# Patient Record
Sex: MALE | Race: WHITE | NOT HISPANIC OR LATINO | Employment: OTHER | URBAN - METROPOLITAN AREA
[De-identification: names, ages, dates, MRNs, and addresses within clinical notes are randomized per-mention and may not be internally consistent; named-entity substitution may affect disease eponyms.]

---

## 2017-05-09 ENCOUNTER — APPOINTMENT (OUTPATIENT)
Dept: GENERAL RADIOLOGY | Facility: HOSPITAL | Age: 32
End: 2017-05-09

## 2017-05-09 ENCOUNTER — TELEPHONE (OUTPATIENT)
Dept: ORTHOPEDIC SURGERY | Facility: CLINIC | Age: 32
End: 2017-05-09

## 2017-05-09 ENCOUNTER — HOSPITAL ENCOUNTER (EMERGENCY)
Facility: HOSPITAL | Age: 32
Discharge: HOME | End: 2017-05-09
Attending: EMERGENCY MEDICINE | Admitting: EMERGENCY MEDICINE

## 2017-05-09 VITALS
HEIGHT: 74 IN | OXYGEN SATURATION: 99 % | SYSTOLIC BLOOD PRESSURE: 134 MMHG | RESPIRATION RATE: 16 BRPM | WEIGHT: 180 LBS | HEART RATE: 72 BPM | DIASTOLIC BLOOD PRESSURE: 78 MMHG | BODY MASS INDEX: 23.1 KG/M2 | TEMPERATURE: 97.6 F

## 2017-05-09 DIAGNOSIS — S92.355A CLOSED NONDISPLACED FRACTURE OF FIFTH METATARSAL BONE OF LEFT FOOT, INITIAL ENCOUNTER: Primary | ICD-10-CM

## 2017-05-09 PROCEDURE — 73630 X-RAY EXAM OF FOOT: CPT

## 2017-05-09 PROCEDURE — 99283 EMERGENCY DEPT VISIT LOW MDM: CPT

## 2017-05-09 RX ORDER — HYDROCODONE BITARTRATE AND ACETAMINOPHEN 5; 325 MG/1; MG/1
1 TABLET ORAL EVERY 6 HOURS PRN
Qty: 12 TABLET | Refills: 0 | Status: SHIPPED | OUTPATIENT
Start: 2017-05-09 | End: 2017-06-07

## 2017-05-09 RX ORDER — ACETAMINOPHEN 500 MG
1000 TABLET ORAL ONCE
Status: COMPLETED | OUTPATIENT
Start: 2017-05-09 | End: 2017-05-09

## 2017-05-09 RX ADMIN — ACETAMINOPHEN 1000 MG: 500 TABLET ORAL at 11:13

## 2017-05-10 ENCOUNTER — OFFICE VISIT (OUTPATIENT)
Dept: ORTHOPEDIC SURGERY | Facility: CLINIC | Age: 32
End: 2017-05-10

## 2017-05-10 DIAGNOSIS — S92.354A CLOSED NONDISPLACED FRACTURE OF FIFTH METATARSAL BONE OF RIGHT FOOT, INITIAL ENCOUNTER: Primary | ICD-10-CM

## 2017-05-10 PROCEDURE — 28470 CLTX METATARSAL FX WO MNP EA: CPT | Performed by: ORTHOPAEDIC SURGERY

## 2017-05-10 PROCEDURE — 99203 OFFICE O/P NEW LOW 30 MIN: CPT | Performed by: ORTHOPAEDIC SURGERY

## 2017-05-12 ENCOUNTER — TELEPHONE (OUTPATIENT)
Dept: ORTHOPEDIC SURGERY | Facility: CLINIC | Age: 32
End: 2017-05-12

## 2017-05-14 PROBLEM — S92.354A CLOSED NONDISPLACED FRACTURE OF FIFTH METATARSAL BONE OF RIGHT FOOT: Status: ACTIVE | Noted: 2017-05-14

## 2017-06-07 ENCOUNTER — OFFICE VISIT (OUTPATIENT)
Dept: ORTHOPEDIC SURGERY | Facility: CLINIC | Age: 32
End: 2017-06-07

## 2017-06-07 VITALS — HEIGHT: 74 IN | WEIGHT: 180 LBS | BODY MASS INDEX: 23.1 KG/M2

## 2017-06-07 DIAGNOSIS — S92.354A CLOSED NONDISPLACED FRACTURE OF FIFTH METATARSAL BONE OF RIGHT FOOT, INITIAL ENCOUNTER: Primary | ICD-10-CM

## 2017-06-07 PROCEDURE — 99024 POSTOP FOLLOW-UP VISIT: CPT | Performed by: ORTHOPAEDIC SURGERY

## 2017-06-07 PROCEDURE — 73620 X-RAY EXAM OF FOOT: CPT | Performed by: ORTHOPAEDIC SURGERY

## 2017-06-07 NOTE — PROGRESS NOTES
Chief Complaint   Patient presents with   • Right Foot - Follow-up           HPI follow up on right foot Fifth metatarsal fracture.  He has tolerated his cast immobilization well.  There are no decubiti proximally or distally.  There is no clinical deformity.  He states that his pain is a whole lot less than before.  The swelling and bruising are present but settling down nicely.  The patient does understand the complexity of  HEALING of the fifth metatarsal fracture at the metaphyseal-diaphyseal junction because of a watershed and a poor blood supply in this location.        There were no vitals filed for this visit.        Review of Systems   Constitutional: Negative.    HENT: Negative.    Eyes: Negative.    Respiratory: Negative.    Cardiovascular: Negative.    Gastrointestinal: Negative.    Endocrine: Negative.    Genitourinary: Negative.    Musculoskeletal: Negative.    Skin: Negative.    Allergic/Immunologic: Negative.    Neurological: Negative.    Hematological: Negative.    Psychiatric/Behavioral: Negative.            Physical Exam   Constitutional: He appears well-developed and well-nourished.   HENT:   Head: Normocephalic.   Eyes: Pupils are equal, round, and reactive to light.   Neck: Neck supple.   Cardiovascular: Normal rate and intact distal pulses.    Pulmonary/Chest: Effort normal and breath sounds normal.   Abdominal: Soft. Bowel sounds are normal.   Musculoskeletal: Normal range of motion.   Neurological: He is alert. He has normal reflexes.   Skin: Skin is warm.   Psychiatric: He has a normal mood and affect. His behavior is normal. Judgment and thought content normal.   Nursing note and vitals reviewed.          Joint/Body Part Specific Exam:  Right foot: Patient has some pain and tenderness at the base of the fifth metatarsal on the lateral aspect at the site of the Powell fracture.  He is neurovascularly intact.  He states his pain is minimal.  Dorsiflexion 0-10°.  Plantar flexion 0-30°.   Neurovascular status is intact.  Patient is able to circumduct his ankle without too much difficulty and there is no clinical evidence of a DVT.      X-RAY Report:      right Foot and Ankle X-Ray  Indication: Right rotation of healing of the fifth metatarsal base  Views: AP, Lateral  Findings: Anatomically reduced fracture; very minimal callus noted  yes fracture  no bony lesion  Soft tissues within normal limits  within normal limits joint spaces  Hardware appropriately positioned not applicable    yes prior studies available for comparison.    X-RAY was ordered and reviewed by Jonnie Abdi MD    Diagnostics:        Obdulio was seen today for follow-up.    Diagnoses and all orders for this visit:    Closed nondisplaced fracture of fifth metatarsal bone of right foot, subsequent encounter  -     XR Foot 2 View Right          Procedures        Plan:  DC the cast.    Follow cam walking boot to immobilize the fracture.    Strict nonweightbearing for 4 more weeks    Use crutches and a motor scooter for mobility.    Calcium and vitamin D for bone health.    Tablet ibuprofen 600 mg tablet by mouth twice a day when necessary pain and discomfort.    Follow-up in 4 weeks for reevaluation and repeat x-rays.    The difficult nature of healing of this fracture discussed with the patient and he does understand that he may still end up needing an external bone stimulator or even an ORIF with bone grafting to allow this fracture to heal completely.

## 2017-07-05 ENCOUNTER — OFFICE VISIT (OUTPATIENT)
Dept: ORTHOPEDIC SURGERY | Facility: CLINIC | Age: 32
End: 2017-07-05

## 2017-07-05 VITALS — HEIGHT: 76 IN | BODY MASS INDEX: 21.92 KG/M2 | WEIGHT: 180 LBS | TEMPERATURE: 98.1 F

## 2017-07-05 DIAGNOSIS — S92.354D CLOSED NONDISPLACED FRACTURE OF FIFTH METATARSAL BONE OF RIGHT FOOT WITH ROUTINE HEALING, SUBSEQUENT ENCOUNTER: Primary | ICD-10-CM

## 2017-07-05 PROCEDURE — 99024 POSTOP FOLLOW-UP VISIT: CPT | Performed by: ORTHOPAEDIC SURGERY

## 2017-07-05 PROCEDURE — 73630 X-RAY EXAM OF FOOT: CPT | Performed by: ORTHOPAEDIC SURGERY

## 2017-07-05 NOTE — PROGRESS NOTES
Chief Complaint   Patient presents with   • Right Foot - Follow-up           HPI  Patient is here today for a follow up of his right foot.He has a base of fifth metatarsal fracture at the Powell location.  He does not have a clinical deformity.  The patient states that he is doing a whole lot better than before.  The range of motion of the hindfoot is improving significantly.  He is being as compliant as possible with limited to partial weightbearing on the foot.  He is using his cam walking boot fairly diligently.  I'm concerned that he is trending towards a delayed union and might require an external bone stimulator.  Eventually he is going to require surgical intervention in the form of screw placement with ORIF for the fifth metatarsal fracture.  At this point the patient is really not interested in any form of surgical intervention because he feels like he is progressing in terms of healing of the fracture because of declining symptoms and settling down off his swelling.        Vitals:    07/05/17 1616   Temp: 98.1 °F (36.7 °C)           Review of Systems   Constitutional: Negative for chills, fever and unexpected weight change.   HENT: Negative for trouble swallowing and voice change.    Eyes: Negative for visual disturbance.   Respiratory: Negative for cough and shortness of breath.    Cardiovascular: Negative for chest pain and leg swelling.   Gastrointestinal: Negative for abdominal pain, nausea and vomiting.   Endocrine: Negative for cold intolerance and heat intolerance.   Genitourinary: Negative for difficulty urinating, frequency and urgency.   Skin: Negative for rash and wound.   Allergic/Immunologic: Negative for immunocompromised state.   Neurological: Negative for weakness and numbness.   Hematological: Does not bruise/bleed easily.   Psychiatric/Behavioral: Negative for dysphoric mood. The patient is not nervous/anxious.            Physical Exam   Constitutional: He is oriented to person, place, and  time. He appears well-nourished.   HENT:   Head: Atraumatic.   Eyes: EOM are normal.   Neck: Neck supple.   Cardiovascular: Intact distal pulses.    Pulmonary/Chest: Breath sounds normal.   Abdominal: Bowel sounds are normal.   Musculoskeletal: Normal range of motion. He exhibits edema and tenderness. He exhibits no deformity.   Neurological: He is alert and oriented to person, place, and time. He has normal reflexes.   Skin: Skin is dry.   Psychiatric: He has a normal mood and affect. His behavior is normal. Judgment and thought content normal.   Nursing note and vitals reviewed.          Joint/Body Part Specific Exam:  Right foot: The swelling and bruising have completely settled down.  There is no clinical deformity.  He has no tenderness at the site of the fracture.  Dorsiflexion 0-20°.  Plantar flexion 0-30°.  Neurovascular status is intact.  Lisfranc's joint is stable.  Refill is 2 seconds with a brisk return.  He is able to circumduct his foot without any difficulty.  Dorsalis pedis artery pulses are palpable.      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    X-RAY was ordered and reviewed by Jonnie Abdi MD        Diagnostics:        Obdulio was seen today for follow-up.    Diagnoses and all orders for this visit:    Closed nondisplaced fracture of fifth metatarsal bone of right foot with routine healing, subsequent encounter  -     XR Foot 3+ View Right            Procedures        Plan:  Continue to use the cam walking boot.    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    Weightbearing as tolerated with the use of the Cam Walker.    Discontinue the crutches.    No contact sports or running or jogging at this point.    It is okay for the patient to drive at this point.    Falls and reinjury precautions discussed with the patient.    Possibility of an intramedullary screw with ORIF after referral to a foot and ankle specialist was discussed and offered to the patient.    Follow-up in my office in 4 weeks.

## 2017-07-14 ENCOUNTER — TELEPHONE (OUTPATIENT)
Dept: ORTHOPEDIC SURGERY | Facility: CLINIC | Age: 32
End: 2017-07-14

## 2017-07-14 DIAGNOSIS — S92.354D CLOSED NONDISPLACED FRACTURE OF FIFTH METATARSAL BONE OF RIGHT FOOT WITH ROUTINE HEALING, SUBSEQUENT ENCOUNTER: Primary | ICD-10-CM

## 2017-07-15 NOTE — TELEPHONE ENCOUNTER
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  Thank you.

## 2017-07-17 NOTE — TELEPHONE ENCOUNTER
"Dr. Abdi,   Can you please correct this dictation to say non-union instead of delayed union. I know from the past Syl said they won't approve unless it says \"non-union\".  Thanks so much,  Ana Lilia"

## 2017-08-02 ENCOUNTER — OFFICE VISIT (OUTPATIENT)
Dept: ORTHOPEDIC SURGERY | Facility: CLINIC | Age: 32
End: 2017-08-02

## 2017-08-02 DIAGNOSIS — S92.354K CLOSED NONDISPLACED FRACTURE OF FIFTH METATARSAL BONE OF RIGHT FOOT WITH NONUNION, SUBSEQUENT ENCOUNTER: ICD-10-CM

## 2017-08-02 DIAGNOSIS — S92.354D CLOSED NONDISPLACED FRACTURE OF FIFTH METATARSAL BONE OF RIGHT FOOT WITH ROUTINE HEALING, SUBSEQUENT ENCOUNTER: Primary | ICD-10-CM

## 2017-08-02 PROBLEM — S92.301K: Status: ACTIVE | Noted: 2017-08-02

## 2017-08-02 PROCEDURE — 73620 X-RAY EXAM OF FOOT: CPT | Performed by: ORTHOPAEDIC SURGERY

## 2017-08-02 PROCEDURE — 99213 OFFICE O/P EST LOW 20 MIN: CPT | Performed by: ORTHOPAEDIC SURGERY

## 2017-08-02 NOTE — PROGRESS NOTES
Chief Complaint   Patient presents with   • Right Foot - Follow-up           HPI the patient is here today for a follow up of his right foot.  He still has some pain and discomfort at the base of his foot.  Swelling is decreased considerably.  He is been using the cam walking boot rigorously.  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  At this point I'm recommending that he use and neck trial bone stimulator.  I do think that this will help him to consolidate the fracture and minimize the possibility of needing surgical intervention.  The possibility of surgery has been discussed with the patient but he is not interested in that option at this point.  The use of an external bone stimulator to allow the fracture to heal and consolidate has been discussed with the patient as well.         There were no vitals filed for this visit.        Review of Systems   Constitutional: Negative.    HENT: Negative.    Eyes: Negative.    Respiratory: Negative.    Cardiovascular: Negative.    Gastrointestinal: Negative.    Endocrine: Negative.    Genitourinary: Negative.    Musculoskeletal: Negative.    Skin: Negative.    Allergic/Immunologic: Negative.    Neurological: Negative.    Hematological: Negative.    Psychiatric/Behavioral: Negative.            Physical Exam   Constitutional: He is oriented to person, place, and time. He appears well-nourished.   HENT:   Head: Atraumatic.   Eyes: EOM are normal.   Neck: Neck supple.   Cardiovascular: Intact distal pulses.    Pulmonary/Chest: Effort normal and breath sounds normal.   Abdominal: Soft. Bowel sounds are normal.   Musculoskeletal: Normal range of motion. He exhibits edema and tenderness.   Neurological: He is alert and oriented to person, place, and time. He has normal reflexes.   Skin: Skin is dry.   Psychiatric: He has a normal mood and affect. His behavior is normal. Judgment and thought content normal.   Nursing note and vitals  reviewed.          Joint/Body Part Specific Exam:  Right foot: There is mild local tenderness.  There is no clinical deformity.  Lisfranc's joint is stable.  Neurovascular status is intact.  Dorsiflexion 0-20°.  Plantar flexion 0-30°.  Patient is able to circumduct the foot without too much difficulty.  Cap refill is 2 seconds with a brisk return.      X-RAY Report:  right Ankle X-Ray  Indication: Evaluation of healing of a fifth metatarsal base fracture-Powell fracture  Views: AP, Lateral  Findings: Nonunion of the fracture.  no fracture  no bony lesion  Soft tissues within normal limits  within normal limits joint spaces  Hardware appropriately positioned not applicable    no prior studies available for comparison.    X-RAY was ordered and reviewed by Jonnie Abdi MD        Diagnostics:        Obdulio was seen today for follow-up.    Diagnoses and all orders for this visit:    Closed nondisplaced fracture of fifth metatarsal bone of right foot with routine healing, subsequent encounter  -     XR Foot 2 View Right            Procedures        Plan:  Continue the cam walking boot to immobilize the fracture.    Elevation to control swelling.    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    We will order the external bone stimulator for the patient to help him to allow this fracture to heal and consolidate.    Nonoperative care discussed with the patient at this point though eventually he might need intramedullary screw fixation and possibly bone grafting.    Follow-up in my office in 6 weeks.    Calcium and vitamin D for bone health.    Tablet ibuprofen 600 mg tab 1 by mouth twice a day when necessary pain swelling and discomfort.    Repeat injury precautions discussed with the patient.

## 2017-08-03 ENCOUNTER — OFFICE VISIT (OUTPATIENT)
Dept: FAMILY MEDICINE CLINIC | Facility: CLINIC | Age: 32
End: 2017-08-03

## 2017-08-03 VITALS
WEIGHT: 179 LBS | SYSTOLIC BLOOD PRESSURE: 120 MMHG | DIASTOLIC BLOOD PRESSURE: 86 MMHG | HEIGHT: 76 IN | HEART RATE: 65 BPM | OXYGEN SATURATION: 99 % | BODY MASS INDEX: 21.8 KG/M2 | TEMPERATURE: 98.6 F

## 2017-08-03 DIAGNOSIS — R53.83 FATIGUE, UNSPECIFIED TYPE: Primary | ICD-10-CM

## 2017-08-03 DIAGNOSIS — R10.13 DYSPEPSIA: ICD-10-CM

## 2017-08-03 LAB
ALBUMIN SERPL-MCNC: 4.8 G/DL (ref 3.5–5.2)
ALBUMIN/GLOB SERPL: 1.9 G/DL
ALP SERPL-CCNC: 72 U/L (ref 39–117)
ALT SERPL-CCNC: 34 U/L (ref 1–41)
AMYLASE SERPL-CCNC: 85 U/L (ref 28–100)
AST SERPL-CCNC: 18 U/L (ref 1–40)
BASOPHILS # BLD AUTO: 0.04 10*3/MM3 (ref 0–0.2)
BASOPHILS NFR BLD AUTO: 0.8 % (ref 0–1.5)
BILIRUB SERPL-MCNC: 0.7 MG/DL (ref 0.1–1.2)
BUN SERPL-MCNC: 12 MG/DL (ref 6–20)
BUN/CREAT SERPL: 14.6 (ref 7–25)
CALCIUM SERPL-MCNC: 9.8 MG/DL (ref 8.6–10.5)
CHLORIDE SERPL-SCNC: 102 MMOL/L (ref 98–107)
CO2 SERPL-SCNC: 27.6 MMOL/L (ref 22–29)
CREAT SERPL-MCNC: 0.82 MG/DL (ref 0.76–1.27)
DIFFERENTIAL COMMENT: NORMAL
EOSINOPHIL # BLD AUTO: 0.26 10*3/MM3 (ref 0–0.7)
EOSINOPHIL NFR BLD AUTO: 5 % (ref 0.3–6.2)
ERYTHROCYTE [DISTWIDTH] IN BLOOD BY AUTOMATED COUNT: 14.8 % (ref 11.5–14.5)
GLOBULIN SER CALC-MCNC: 2.5 GM/DL
GLUCOSE SERPL-MCNC: 81 MG/DL (ref 65–99)
HCT VFR BLD AUTO: 42.3 % (ref 40.4–52.2)
HGB BLD-MCNC: 13.5 G/DL (ref 13.7–17.6)
IMM GRANULOCYTES # BLD: 0 10*3/MM3 (ref 0–0.03)
IMM GRANULOCYTES NFR BLD: 0 % (ref 0–0.5)
LIPASE SERPL-CCNC: 37 U/L (ref 13–60)
LYMPHOCYTES # BLD AUTO: 1.68 10*3/MM3 (ref 0.9–4.8)
LYMPHOCYTES NFR BLD AUTO: 32.4 % (ref 19.6–45.3)
MCH RBC QN AUTO: 22.6 PG (ref 27–32.7)
MCHC RBC AUTO-ENTMCNC: 31.9 G/DL (ref 32.6–36.4)
MCV RBC AUTO: 70.7 FL (ref 79.8–96.2)
MONOCYTES # BLD AUTO: 0.61 10*3/MM3 (ref 0.2–1.2)
MONOCYTES NFR BLD AUTO: 11.8 % (ref 5–12)
NEUTROPHILS # BLD AUTO: 2.59 10*3/MM3 (ref 1.9–8.1)
NEUTROPHILS NFR BLD AUTO: 50 % (ref 42.7–76)
NRBC BLD AUTO-RTO: 0 /100 WBC (ref 0–0)
PLATELET # BLD AUTO: 231 10*3/MM3 (ref 140–500)
PLATELET BLD QL SMEAR: NORMAL
POTASSIUM SERPL-SCNC: 4.8 MMOL/L (ref 3.5–5.2)
PROT SERPL-MCNC: 7.3 G/DL (ref 6–8.5)
RBC # BLD AUTO: 5.98 10*6/MM3 (ref 4.6–6)
RBC MORPH BLD: NORMAL
SODIUM SERPL-SCNC: 142 MMOL/L (ref 136–145)
T4 FREE SERPL-MCNC: 1.55 NG/DL (ref 0.93–1.7)
TSH SERPL DL<=0.005 MIU/L-ACNC: 1.14 MIU/ML (ref 0.27–4.2)
WBC # BLD AUTO: 5.18 10*3/MM3 (ref 4.5–10.7)

## 2017-08-03 PROCEDURE — 99214 OFFICE O/P EST MOD 30 MIN: CPT | Performed by: INTERNAL MEDICINE

## 2017-08-03 RX ORDER — OMEPRAZOLE 40 MG/1
40 CAPSULE, DELAYED RELEASE ORAL DAILY
Qty: 30 CAPSULE | Refills: 2 | Status: SHIPPED | OUTPATIENT
Start: 2017-08-03 | End: 2019-03-06

## 2017-08-03 NOTE — PROGRESS NOTES
Subjective complaint is possible ulcer and fatigue  Obdulio Rajan Jr. is a 32 y.o. male.     History of Present Illness   Orly is here today for fatigue.  He did fracture his right fifth metatarsal in May of this year.  He has been on some anti-inflammatory initially for the pain.  He has also been taking some baby aspirin because of the immobility.  He does have a prior history of an ulcer.  He is feeling some nausea but not vomiting.  He has not noticed any change in the color or consistency of his stool.  He does report that his fatigue may be due to not sleeping very well.  His orthopedist once him wearing the boot while he sleeps at night.  He did have an EGD performed in 2012 or 2013 by Dr. Zhang.  This showed no evidence of helical back to her.  The following portions of the patient's history were reviewed and updated as appropriate: allergies, current medications, past medical history and problem list.    Review of Systems   Constitutional: Positive for fatigue.   Gastrointestinal: Positive for diarrhea and nausea. Negative for abdominal pain, blood in stool and vomiting.       Objective   Physical Exam   Constitutional: He appears well-developed and well-nourished.   Eyes: Conjunctivae are normal. No scleral icterus.   Cardiovascular: Normal rate, regular rhythm and normal heart sounds.    Pulmonary/Chest: Effort normal and breath sounds normal. No respiratory distress. He has no wheezes. He has no rales.   Abdominal: Soft. Bowel sounds are normal. He exhibits no distension. There is no tenderness. There is no rebound and no guarding.   Musculoskeletal: He exhibits no edema.   Nursing note and vitals reviewed.      Assessment/Plan   Obdulio was seen today for follow-up and fatigue.    Diagnoses and all orders for this visit:    Fatigue, unspecified type  -     CBC & Differential  -     Comprehensive Metabolic Panel  -     TSH+Free T4    Dyspepsia  -     CBC & Differential  -     Amylase  -      Lipase    Other orders  -     omeprazole (priLOSEC) 40 MG capsule; Take 1 capsule by mouth Daily.      Orly is here today for evaluation of fatigue.  I am going to check a CBC and thyroid as well as a chemistry panel amylase and lipase.  Because of his prior history of ulcers and being on the nonsteroidals plus aspirin I am going to start him on some omeprazole 40 mg daily to take one half hour prior to breakfast.  We are going to send him home with a occult blood stool kit.  If he comes back positive for occult blood were is anemic we will have him see a gastroenterologist.

## 2017-08-08 ENCOUNTER — CLINICAL SUPPORT (OUTPATIENT)
Dept: FAMILY MEDICINE CLINIC | Facility: CLINIC | Age: 32
End: 2017-08-08

## 2017-08-08 DIAGNOSIS — Z00.00 HEALTHCARE MAINTENANCE: Primary | ICD-10-CM

## 2017-08-08 LAB
DEVELOPER EXPIRATION DATE: NORMAL
DEVELOPER LOT NUMBER: NORMAL
EXPIRATION DATE: NORMAL
FECAL OCCULT BLOOD SCREEN, POC: NEGATIVE
Lab: NORMAL
NEGATIVE CONTROL: NEGATIVE
POSITIVE CONTROL: POSITIVE

## 2017-08-08 PROCEDURE — 82270 OCCULT BLOOD FECES: CPT | Performed by: INTERNAL MEDICINE

## 2017-09-08 ENCOUNTER — OFFICE VISIT (OUTPATIENT)
Dept: FAMILY MEDICINE CLINIC | Facility: CLINIC | Age: 32
End: 2017-09-08

## 2017-09-08 VITALS
HEART RATE: 70 BPM | BODY MASS INDEX: 22.29 KG/M2 | OXYGEN SATURATION: 98 % | SYSTOLIC BLOOD PRESSURE: 120 MMHG | TEMPERATURE: 97.9 F | DIASTOLIC BLOOD PRESSURE: 86 MMHG | WEIGHT: 183 LBS | HEIGHT: 76 IN

## 2017-09-08 DIAGNOSIS — K30 CHRONIC UPSET STOMACH: ICD-10-CM

## 2017-09-08 DIAGNOSIS — R42 DIZZINESS: Primary | ICD-10-CM

## 2017-09-08 PROCEDURE — 99213 OFFICE O/P EST LOW 20 MIN: CPT | Performed by: INTERNAL MEDICINE

## 2017-09-08 NOTE — PROGRESS NOTES
Subjective chief complaint is follow-up for stomach problems  Obdulio Rajan Jr. is a 32 y.o. male.     History of Present Illness   Orly is here today for follow-up.  Since his last visit he has felt a little bit better in terms of the nausea with eating.  The omeprazole seems of helped some with that.  He still however is feeling somewhat lightheaded after eating.  Does not necessarily matter what type of food he eats.  He has not noticed any problems with wheat or gluten products.  He is notany problems with dairy.  He sometimes gets some gas type pain in the left chest after eating.  Since his last visit his weight has increased by 4 pounds.  His appetite has improved.  His lab work was relatively normal.  He does not have any problems with dizziness or lightheadedness when cutting the grass or exerting himself.  The following portions of the patient's history were reviewed and updated as appropriate: allergies, current medications, past medical history and problem list.    Review of Systems   Gastrointestinal: Negative for blood in stool, nausea and vomiting.       Objective   Physical Exam   Cardiovascular: Normal rate, regular rhythm and normal heart sounds.    Pulmonary/Chest: Effort normal and breath sounds normal.   Abdominal: Soft. Bowel sounds are normal. He exhibits no distension and no mass. There is no tenderness.   Nursing note and vitals reviewed.      Assessment/Plan   Obdulio was seen today for follow-up and dizziness.    Diagnoses and all orders for this visit:    Dizziness    Chronic upset stomach  -     Celiac Comprehensive Panel  -     Ambulatory Referral to Gastroenterology  -     Food Allergy Profile      Orly is here today for follow-up.  Some of his stomach symptoms have improved.  He is still getting some lightheadedness after eating.  I'm wondering whether this could be a vagal reaction.  I am going to check a celiac panel and food allergy panel.  We may consider having him  wear a Holter monitor so we can see his postprandial heart rates.

## 2017-09-11 DIAGNOSIS — K90.0 TRANSIENT GLUTEN SENSITIVITY: Primary | ICD-10-CM

## 2017-09-11 LAB
CODFISH IGE QN: <0.1 KU/L
CONV CLASS DESCRIPTION: ABNORMAL
COW MILK IGE QN: <0.1 KU/L
EGG WHITE IGE QN: <0.1 KU/L
ENDOMYSIUM IGA SER QL: NEGATIVE
GLIADIN PEPTIDE IGA SER-ACNC: 22 UNITS (ref 0–19)
GLIADIN PEPTIDE IGG SER-ACNC: 3 UNITS (ref 0–19)
IGA SERPL-MCNC: 236 MG/DL (ref 90–386)
PEANUT IGE QN: 0.19 KU/L
SOYBEAN IGE QN: 0.18 KU/L
TTG IGA SER-ACNC: <2 U/ML (ref 0–3)
TTG IGG SER-ACNC: <2 U/ML (ref 0–5)
WHEAT IGE QN: 0.15 KU/L

## 2017-09-13 ENCOUNTER — OFFICE VISIT (OUTPATIENT)
Dept: ORTHOPEDIC SURGERY | Facility: CLINIC | Age: 32
End: 2017-09-13

## 2017-09-13 VITALS — WEIGHT: 180 LBS | TEMPERATURE: 98.3 F | BODY MASS INDEX: 21.92 KG/M2 | HEIGHT: 76 IN

## 2017-09-13 DIAGNOSIS — Z87.81 H/O FRACTURE OF FOOT: Primary | ICD-10-CM

## 2017-09-13 DIAGNOSIS — S92.354D CLOSED NONDISPLACED FRACTURE OF FIFTH METATARSAL BONE OF RIGHT FOOT WITH ROUTINE HEALING, SUBSEQUENT ENCOUNTER: ICD-10-CM

## 2017-09-13 DIAGNOSIS — S92.354K CLOSED NONDISPLACED FRACTURE OF FIFTH METATARSAL BONE OF RIGHT FOOT WITH NONUNION, SUBSEQUENT ENCOUNTER: ICD-10-CM

## 2017-09-13 PROCEDURE — 99213 OFFICE O/P EST LOW 20 MIN: CPT | Performed by: ORTHOPAEDIC SURGERY

## 2017-09-13 PROCEDURE — 73620 X-RAY EXAM OF FOOT: CPT | Performed by: ORTHOPAEDIC SURGERY

## 2017-09-13 NOTE — PROGRESS NOTES
Chief Complaint   Patient presents with   • Right Foot - Follow-up           HPI  Patient is here today for a follow up of his right foot.Patient is doing a whole lot better at this point.  The swelling and bruising is settled down completely.  He does not have a clinical deformity.  He does not have any significant pain whatsoever.  He has started to wean himself off the cam walking boot.  He has been using the external bone stimulator for the past 14 days and that seems to be helping his symptoms.  The patient states that he is starting to become more active in terms of walking with the cam walker off and it does not really seem to bother him.  He has not tried to run or participate in sports just yet.        Vitals:    09/13/17 1407   Temp: 98.3 °F (36.8 °C)           Review of Systems   Constitutional: Negative.    HENT: Negative.    Eyes: Negative.    Respiratory: Negative.    Cardiovascular: Negative.    Gastrointestinal: Negative.    Endocrine: Negative.    Genitourinary: Negative.    Musculoskeletal: Negative.    Skin: Negative.    Allergic/Immunologic: Negative.    Neurological: Negative.    Hematological: Negative.    Psychiatric/Behavioral: Negative.            Physical Exam   Constitutional: He appears well-nourished.   HENT:   Head: Atraumatic.   Eyes: EOM are normal.   Neck: Neck supple.   Cardiovascular: Normal rate and intact distal pulses.    Pulmonary/Chest: Effort normal and breath sounds normal.   Abdominal: Soft. Bowel sounds are normal.   Neurological: He is alert. He has normal reflexes.   Skin: Skin is warm.   Psychiatric: He has a normal mood and affect. His behavior is normal. Judgment and thought content normal.   Nursing note and vitals reviewed.          Joint/Body Part Specific Exam:  Right foot: The patient has no clinical deformity.  The Lisfranc joint is stable.  The swelling and bruising is settled down nicely.  Dorsiflexion is 0-20°.  Plantar flexion 0-40°.  Patient is able to  circumduct the foot and ankle without any difficulty.  Neurovascular status is intact.  There is no evidence of a DVT or a compartmental syndrome.      X-RAY Report:  right foot and Ankle X-Ray  Indication: Evaluation of healing of the fifth metatarsal Powell fracture  Views: AP, Lateral  Findings: Anatomically reduced fracture, healing well but not fully consolidated.  yes fracture  no bony lesion  Soft tissues within normal limits  within normal limits joint spaces  Hardware appropriately positioned not applicable    yes prior studies available for comparison.    X-RAY was ordered and reviewed by Jonnie Abdi MD        Diagnostics:        Obdulio was seen today for follow-up.    Diagnoses and all orders for this visit:    H/O fracture of foot  -     XR Foot 2 View Right            Procedures        Plan:  Weightbearing as tolerated.    Refracture precautions.    Wean off the cam walking boot.    Tablet ibuprofen 600 mg orally twice a day p.m. pain and discomfort.    Tinnitus use the external bone stimulator.    4 calcium and vitamin D for bone health.    Follow-up in my office in 3 months for reevaluation off the fracture consolidation.

## 2017-09-16 ENCOUNTER — HOSPITAL ENCOUNTER (EMERGENCY)
Facility: HOSPITAL | Age: 32
Discharge: HOME | End: 2017-09-16
Attending: EMERGENCY MEDICINE | Admitting: EMERGENCY MEDICINE

## 2017-09-16 VITALS
BODY MASS INDEX: 21.92 KG/M2 | RESPIRATION RATE: 16 BRPM | HEART RATE: 42 BPM | TEMPERATURE: 96.6 F | HEIGHT: 76 IN | SYSTOLIC BLOOD PRESSURE: 130 MMHG | DIASTOLIC BLOOD PRESSURE: 85 MMHG | OXYGEN SATURATION: 98 % | WEIGHT: 180 LBS

## 2017-09-16 DIAGNOSIS — E61.1 IRON DEFICIENCY: ICD-10-CM

## 2017-09-16 DIAGNOSIS — R42 DIZZINESS: Primary | ICD-10-CM

## 2017-09-16 LAB
ACANTHOCYTES BLD QL SMEAR: NORMAL
ALBUMIN SERPL-MCNC: 4.6 G/DL (ref 3.5–5.2)
ALBUMIN/GLOB SERPL: 2.1 G/DL
ALP SERPL-CCNC: 70 U/L (ref 39–117)
ALT SERPL W P-5'-P-CCNC: 29 U/L (ref 1–41)
ANION GAP SERPL CALCULATED.3IONS-SCNC: 11.1 MMOL/L
AST SERPL-CCNC: 18 U/L (ref 1–40)
BASOPHILS # BLD AUTO: 0.03 10*3/MM3 (ref 0–0.2)
BASOPHILS NFR BLD AUTO: 0.5 % (ref 0–1.5)
BILIRUB SERPL-MCNC: 0.4 MG/DL (ref 0.1–1.2)
BUN BLD-MCNC: 9 MG/DL (ref 6–20)
BUN/CREAT SERPL: 8.7 (ref 7–25)
CALCIUM SPEC-SCNC: 9.6 MG/DL (ref 8.6–10.5)
CHLORIDE SERPL-SCNC: 102 MMOL/L (ref 98–107)
CO2 SERPL-SCNC: 29.9 MMOL/L (ref 22–29)
CREAT BLD-MCNC: 1.04 MG/DL (ref 0.76–1.27)
DEPRECATED RDW RBC AUTO: 35.8 FL (ref 37–54)
ELLIPTOCYTES BLD QL SMEAR: NORMAL
EOSINOPHIL # BLD AUTO: 0.28 10*3/MM3 (ref 0–0.7)
EOSINOPHIL NFR BLD AUTO: 4.9 % (ref 0.3–6.2)
ERYTHROCYTE [DISTWIDTH] IN BLOOD BY AUTOMATED COUNT: 14.2 % (ref 11.5–14.5)
GFR SERPL CREATININE-BSD FRML MDRD: 83 ML/MIN/1.73
GLOBULIN UR ELPH-MCNC: 2.2 GM/DL
GLUCOSE BLD-MCNC: 93 MG/DL (ref 65–99)
HCT VFR BLD AUTO: 40.2 % (ref 40.4–52.2)
HGB BLD-MCNC: 12.8 G/DL (ref 13.7–17.6)
HYPOCHROMIA BLD QL: NORMAL
IMM GRANULOCYTES # BLD: 0 10*3/MM3 (ref 0–0.03)
IMM GRANULOCYTES NFR BLD: 0 % (ref 0–0.5)
IRON 24H UR-MRATE: 65 MCG/DL (ref 59–158)
IRON SATN MFR SERPL: 25 % (ref 20–50)
LYMPHOCYTES # BLD AUTO: 2.29 10*3/MM3 (ref 0.9–4.8)
LYMPHOCYTES NFR BLD AUTO: 40.1 % (ref 19.6–45.3)
MCH RBC QN AUTO: 22.3 PG (ref 27–32.7)
MCHC RBC AUTO-ENTMCNC: 31.8 G/DL (ref 32.6–36.4)
MCV RBC AUTO: 69.9 FL (ref 79.8–96.2)
MICROCYTES BLD QL: NORMAL
MONOCYTES # BLD AUTO: 0.62 10*3/MM3 (ref 0.2–1.2)
MONOCYTES NFR BLD AUTO: 10.9 % (ref 5–12)
NEUTROPHILS # BLD AUTO: 2.49 10*3/MM3 (ref 1.9–8.1)
NEUTROPHILS NFR BLD AUTO: 43.6 % (ref 42.7–76)
NRBC BLD MANUAL-RTO: 0 /100 WBC (ref 0–0)
PLAT MORPH BLD: NORMAL
PLATELET # BLD AUTO: 242 10*3/MM3 (ref 140–500)
PMV BLD AUTO: 11.6 FL (ref 6–12)
POTASSIUM BLD-SCNC: 4.2 MMOL/L (ref 3.5–5.2)
PROT SERPL-MCNC: 6.8 G/DL (ref 6–8.5)
RBC # BLD AUTO: 5.75 10*6/MM3 (ref 4.6–6)
SODIUM BLD-SCNC: 143 MMOL/L (ref 136–145)
TIBC SERPL-MCNC: 265 MCG/DL (ref 298–536)
TRANSFERRIN SERPL-MCNC: 178 MG/DL (ref 200–360)
WBC MORPH BLD: NORMAL
WBC NRBC COR # BLD: 5.71 10*3/MM3 (ref 4.5–10.7)

## 2017-09-16 PROCEDURE — 85025 COMPLETE CBC W/AUTO DIFF WBC: CPT | Performed by: EMERGENCY MEDICINE

## 2017-09-16 PROCEDURE — 93005 ELECTROCARDIOGRAM TRACING: CPT | Performed by: EMERGENCY MEDICINE

## 2017-09-16 PROCEDURE — 84466 ASSAY OF TRANSFERRIN: CPT | Performed by: EMERGENCY MEDICINE

## 2017-09-16 PROCEDURE — 80053 COMPREHEN METABOLIC PANEL: CPT | Performed by: EMERGENCY MEDICINE

## 2017-09-16 PROCEDURE — 99283 EMERGENCY DEPT VISIT LOW MDM: CPT

## 2017-09-16 PROCEDURE — 85007 BL SMEAR W/DIFF WBC COUNT: CPT | Performed by: EMERGENCY MEDICINE

## 2017-09-16 PROCEDURE — 93010 ELECTROCARDIOGRAM REPORT: CPT | Performed by: INTERNAL MEDICINE

## 2017-09-16 PROCEDURE — 83540 ASSAY OF IRON: CPT | Performed by: EMERGENCY MEDICINE

## 2017-09-16 NOTE — ED TRIAGE NOTES
"Pt reports lightheadedness and left sided chest \"pressure\" that began tonight. Denies SOA. Pt c/o dizziness x3 months.  "

## 2017-09-16 NOTE — ED PROVIDER NOTES
EMERGENCY DEPARTMENT ENCOUNTER    CHIEF COMPLAINT  Chief Complaint: lightheadedness   History given by: pt  History limited by: none  Room Number: 32/32  PMD: Hero Gill MD      HPI:  Pt is a 32 y.o. male who presents complaining of intermittent lightheadedness for the past 3 months. He had an episode of left sided chest pressure tonight. He saw his primary care doctor earlier this week who is working him up for these sx. He denies SOA, leg swelling, cough, fever and other complaints at this time. He has hx of prior ulcer.     Duration:  3 months   Onset: gradual   Timing: intermittent   Quality: lightheaded   Intensity/Severity: moderate   Progression: unchanged   Associated Symptoms: CP  Aggravating Factors: none  Alleviating Factors: none  Previous Episodes: no prior episodes reported.   Treatment before arrival: He saw his primary care doctor earlier this week for these sx.     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 No pertinent surgical history.    FAMILY HISTORY  History reviewed. No pertinent family history.    SOCIAL HISTORY  Social History     Social History   • Marital status: Single     Spouse name: N/A   • Number of children: N/A   • Years of education: N/A     Occupational History   • Not on file.     Social History Main Topics   • Smoking status: Never Smoker   • Smokeless tobacco: Never Used   • Alcohol use Yes      Comment: occ   • Drug use: No   • Sexual activity: Not on file     Other Topics Concern   • Not on file     Social History  Narrative   • No narrative on file       ALLERGIES  Sulfa antibiotics    REVIEW OF SYSTEMS  Review of Systems   Constitutional: Negative for activity change, appetite change and fever.   HENT: Negative for congestion and sore throat.    Eyes: Negative.    Respiratory: Negative for cough and shortness of breath.    Cardiovascular: Positive for chest pain. Negative for leg swelling.   Gastrointestinal: Negative for abdominal pain, diarrhea and vomiting.   Endocrine: Negative.    Genitourinary: Negative for decreased urine volume and dysuria.   Musculoskeletal: Negative for neck pain.   Skin: Negative for rash and wound.   Allergic/Immunologic: Negative.    Neurological: Positive for light-headedness. Negative for weakness, numbness and headaches.   Hematological: Negative.    Psychiatric/Behavioral: Negative.    All other systems reviewed and are negative.      PHYSICAL EXAM  ED Triage Vitals   Temp Heart Rate Resp BP SpO2   09/16/17 0128 09/16/17 0128 09/16/17 0128 09/16/17 0139 09/16/17 0128   96.6 °F (35.9 °C) 69 16 134/91 99 %      Temp src Heart Rate Source Patient Position BP Location FiO2 (%)   09/16/17 0128 -- -- -- --   Tympanic           Physical Exam   Constitutional: He is oriented to person, place, and time and well-developed, well-nourished, and in no distress.   HENT:   Head: Normocephalic and atraumatic.   Eyes: EOM are normal. Pupils are equal, round, and reactive to light.   Neck: Normal range of motion. Neck supple.   Cardiovascular: Normal rate, regular rhythm and normal heart sounds.    Pulmonary/Chest: Effort normal and breath sounds normal. No respiratory distress.   Abdominal: Soft. There is no tenderness. There is no rebound and no guarding.   Musculoskeletal: Normal range of motion. He exhibits no edema.   Neurological: He is alert and oriented to person, place, and time. He has normal sensation and normal strength.   Skin: Skin is warm and dry.   Psychiatric: Mood and affect normal.    Nursing note and vitals reviewed.      LAB RESULTS  Lab Results (last 24 hours)     Procedure Component Value Units Date/Time    CBC & Differential [[id number]] Collected:  09/16/17 0155    Specimen:  Blood Updated:  09/16/17 0219    Narrative:       The following orders were created for panel order CBC & Differential.  Procedure                               Abnormality         Status                     ---------                               -----------         ------                     Scan Slide[[id number]]                                        Final result               CBC Auto Differential[[id number]]         Abnormal            Final result                 Please view results for these tests on the individual orders.    Comprehensive Metabolic Panel [[id number]]  (Abnormal) Collected:  09/16/17 0155    Specimen:  Blood Updated:  09/16/17 0233     Glucose 93 mg/dL      BUN 9 mg/dL      Creatinine 1.04 mg/dL      Sodium 143 mmol/L      Potassium 4.2 mmol/L      Chloride 102 mmol/L      CO2 29.9 (H) mmol/L      Calcium 9.6 mg/dL      Total Protein 6.8 g/dL      Albumin 4.60 g/dL      ALT (SGPT) 29 U/L      AST (SGOT) 18 U/L      Alkaline Phosphatase 70 U/L      Total Bilirubin 0.4 mg/dL      eGFR Non African Amer 83 mL/min/1.73      Globulin 2.2 gm/dL      A/G Ratio 2.1 g/dL      BUN/Creatinine Ratio 8.7     Anion Gap 11.1 mmol/L     Iron Profile [[id number]]  (Abnormal) Collected:  09/16/17 0155    Specimen:  Blood Updated:  09/16/17 0233     Iron 65 mcg/dL      Iron Saturation 25 %      Transferrin 178 (L) mg/dL      TIBC 265 mcg/dL     CBC Auto Differential [[id number]]  (Abnormal) Collected:  09/16/17 0155    Specimen:  Blood Updated:  09/16/17 0219     WBC 5.71 10*3/mm3      RBC 5.75 10*6/mm3      Hemoglobin 12.8 (L) g/dL      Hematocrit 40.2 (L) %      MCV 69.9 (L) fL      MCH 22.3 (L) pg      MCHC 31.8 (L) g/dL      RDW 14.2 %      RDW-SD 35.8 (L) fl      MPV 11.6 fL      Platelets 242 10*3/mm3      Neutrophil  % 43.6 %      Lymphocyte % 40.1 %      Monocyte % 10.9 %      Eosinophil % 4.9 %      Basophil % 0.5 %      Immature Grans % 0.0 %      Neutrophils, Absolute 2.49 10*3/mm3      Lymphocytes, Absolute 2.29 10*3/mm3      Monocytes, Absolute 0.62 10*3/mm3      Eosinophils, Absolute 0.28 10*3/mm3      Basophils, Absolute 0.03 10*3/mm3      Immature Grans, Absolute 0.00 10*3/mm3      nRBC 0.0 /100 WBC     Scan Slide [[id number]] Collected:  17 0155    Specimen:  Blood Updated:  179     Acanthocytes Slight/1+     Elliptocytes Slight/1+     Hypochromia Slight/1+     Microcytes Mod/2+     WBC Morphology Normal     Platelet Morphology Normal          I ordered the above labs and reviewed the results    PROCEDURES  Procedures  EKG           EKG time: 1:46   Rhythm/Rate: NSR, 50  Incomplete RBBB  Normal intervals  Normal axis     Interpreted Contemporaneously by me, independently viewed  unchanged compared to prior from 12      PROGRESS AND CONSULTS  ED Course       01:52 Ordered blood work, EKG, and iron profile for further evaluation.     03:09 Rechecked pt. He is resting comfortably with family at bedside. Spoke with pt about lab results, which showed hemoglobin of 12 and iron deficiency. Informed of plan to discharge and to f/u with PCP. Pt agrees with course of care. Addressed all questions. /84. HR 49. O2 100%.     MEDICAL DECISION MAKING  Results were reviewed/discussed with the patient and they were also made aware of online access. Pt also made aware that some labs, such as cultures, will not be resulted during ER visit and follow up with PMD is necessary.     MDM  Number of Diagnoses or Management Options  Dizziness:   Iron deficiency:      Amount and/or Complexity of Data Reviewed  Clinical lab tests: reviewed and ordered (HC.8  MCV: 69.9)  Tests in the medicine section of CPT®: reviewed and ordered (EKG)  Decide to obtain previous medical records or to obtain history from someone other  than the patient: yes  Review and summarize past medical records: yes    Patient Progress  Patient progress: stable         DIAGNOSIS  Final diagnoses:   Dizziness   Iron deficiency       DISPOSITION  DISCHARGE    Patient discharged in stable condition.    Reviewed implications of results, diagnosis, meds, responsibility to follow up, warning signs and symptoms of possible worsening, potential complications and reasons to return to the ED.    Patient/Family voiced understanding of above instructions.    Discussed plan for discharge, as there is no emergent indication for admission.  Pt/family is agreeable and understands need for follow up and repeat testing.  Pt is aware that discharge does not mean that nothing is wrong but it indicates no emergency is present that requires admission and they must continue care with follow-up as given below or physician of their choice.     FOLLOW-UP  Hero Gill MD  [address] [id number] [phone]               Medication List      New Prescriptions          pediatric multivitamin-iron solution   Take 1 mL by mouth Daily.               Latest Documented Vital Signs:  As of 3:11 AM  BP- 132/84 HR- (!) 45 Temp- 96.6 °F (35.9 °C) (Tympanic) O2 sat- 100%    --  Documentation assistance provided by vicki Mcdowell for Dr. Lopez.  Information recorded by the scribsonam was done at my direction and has been verified and validated by me.        Gaby Mcdowell  09/16/17 0312       James Lopez MD  09/16/17 0323

## 2017-09-16 NOTE — ED NOTES
Pt reports he has been feeling dizzy for about 3 months. Describes it as a swimmy head feeling. Starting tonight he had left sided chest pressure but denies soa, n/v/d, or fever.     Pj Joseph RN  09/16/17 3306

## 2017-09-18 ENCOUNTER — TELEPHONE (OUTPATIENT)
Dept: FAMILY MEDICINE CLINIC | Facility: CLINIC | Age: 32
End: 2017-09-18

## 2017-09-18 ENCOUNTER — TELEPHONE (OUTPATIENT)
Dept: SOCIAL WORK | Facility: HOSPITAL | Age: 32
End: 2017-09-18

## 2017-09-18 DIAGNOSIS — R42 EPISODIC LIGHTHEADEDNESS: Primary | ICD-10-CM

## 2017-09-18 NOTE — TELEPHONE ENCOUNTER
ED f/u phone call: states he is taking prescribed med, feeling better and has upcoming f/u sarwat't w/ PCP. No questions/concerns

## 2017-09-18 NOTE — TELEPHONE ENCOUNTER
----- Message from Talya Mathew sent at 9/18/2017 12:23 PM EDT -----  PATIENT CALLED, WOULD LIKE TO SPEAK WITH YOU REGARDING ONGOING LIGHTHEADEDNESS     PATIENT NUMBER [phone]  The patient had an episode of lightheadedness associated with some chest pain he went to the emergency room were nothing was really found.  He was anemic which is characteristic of a thalassemia.  His iron stores were actually normal even know he said he was told that his iron level was low.  I advised would not hurt him to take the iron that was prescribed.  We will order a Holter monitor.

## 2017-09-20 ENCOUNTER — HOSPITAL ENCOUNTER (OUTPATIENT)
Dept: CARDIOLOGY | Facility: HOSPITAL | Age: 32
Discharge: HOME | End: 2017-09-20
Attending: INTERNAL MEDICINE | Admitting: INTERNAL MEDICINE

## 2017-09-20 DIAGNOSIS — R42 EPISODIC LIGHTHEADEDNESS: ICD-10-CM

## 2017-09-20 PROBLEM — Z87.81 H/O FRACTURE OF FOOT: Status: ACTIVE | Noted: 2017-09-20

## 2017-09-20 PROCEDURE — 93225 XTRNL ECG REC<48 HRS REC: CPT

## 2017-09-20 PROCEDURE — 93226 XTRNL ECG REC<48 HR SCAN A/R: CPT

## 2017-09-25 ENCOUNTER — OFFICE VISIT (OUTPATIENT)
Dept: GASTROENTEROLOGY | Facility: CLINIC | Age: 32
End: 2017-09-25

## 2017-09-25 VITALS
HEIGHT: 76 IN | DIASTOLIC BLOOD PRESSURE: 78 MMHG | SYSTOLIC BLOOD PRESSURE: 116 MMHG | WEIGHT: 177 LBS | BODY MASS INDEX: 21.55 KG/M2

## 2017-09-25 DIAGNOSIS — Z87.11 HISTORY OF PEPTIC ULCER DISEASE: ICD-10-CM

## 2017-09-25 DIAGNOSIS — Z91.018 MULTIPLE FOOD ALLERGIES: ICD-10-CM

## 2017-09-25 DIAGNOSIS — D50.9 MICROCYTIC ANEMIA: Primary | ICD-10-CM

## 2017-09-25 LAB — FERRITIN SERPL-MCNC: 250.5 NG/ML (ref 30–400)

## 2017-09-25 PROCEDURE — 99204 OFFICE O/P NEW MOD 45 MIN: CPT | Performed by: INTERNAL MEDICINE

## 2017-09-25 NOTE — PROGRESS NOTES
"Chief Complaint   Patient presents with   • Dizziness     food allergy test came back positive       Subjective     HPI    Obdulio Rajan Jr. is a 32 y.o. male with a past medical history noted below who presents for evaluation of food allergies.  He had testing performed some dyspepsia symptoms that he thought was related to a peptic ulcer (had a history of this before-- about 5 years ago in the setting of nsaid use).  He feels \"light headed\" after eating along with an \"unsettled\" queasy feeling in his stomach.  There is no vomiting.  Symptoms progressing since breaking his foot in May. He had been taking an 81mg aspirin for a 4 week period while he was in a cast.  He has not had any further NSAIDs.    He had allergy testing with his pcp with negative celiac testing but allergic to wheat, soy, and peanuts. He has been avoiding these foods.  The symptoms have improved with these changes but are not really resolved.    No GI malignancies in his family.  There is a history of thalassemia in his family and he believes he was told that he carries the trait.    No smoking, occasional ETOH use.  Works in [email or web address].    His hb has been mildly low with microcytic indices.  Iron was 65 but no ferritin checked.      He reports that his energy levels have been good.  He is not having lightheaded or dizziness with work.  It is only associated with eating.  His weight has been stable.  He has had no diarrhea, no constipation.        Past Medical History:   Diagnosis Date   • GERD (gastroesophageal reflux disease)    • Stomach ulcer          Current Outpatient Prescriptions:   •  omeprazole (priLOSEC) 40 MG capsule, Take 1 capsule by mouth Daily., Disp: 30 capsule, Rfl: 2  •  pediatric multivitamin-iron (POLY-VI-SOL with IRON) solution, Take 1 mL by mouth Daily., Disp: 50 mL, Rfl: 0    Allergies   Allergen Reactions   • Sulfa Antibiotics        Social History     Social History   • Marital status: Single     Spouse name: N/A "   • Number of children: N/A   • Years of education: N/A     Occupational History   • Not on file.     Social History Main Topics   • Smoking status: Never Smoker   • Smokeless tobacco: Never Used   • Alcohol use Yes      Comment: occ   • Drug use: No   • Sexual activity: Not on file     Other Topics Concern   • Not on file     Social History Narrative       History reviewed. No pertinent family history.    Review of Systems   Constitutional: Negative for activity change, appetite change and fatigue.   HENT: Negative for sore throat and trouble swallowing.    Respiratory: Negative.    Cardiovascular: Negative.    Gastrointestinal: Positive for nausea. Negative for abdominal distention, abdominal pain, blood in stool, diarrhea and vomiting.   Endocrine: Negative for cold intolerance and heat intolerance.   Genitourinary: Negative for difficulty urinating, dysuria and frequency.   Musculoskeletal: Negative for arthralgias, back pain and myalgias.   Neurological: Positive for light-headedness.   Hematological: Negative for adenopathy. Does not bruise/bleed easily.   All other systems reviewed and are negative.      Objective     Vitals:    09/25/17 0819   BP: 116/78     Last 2 weights    09/25/17 0819   Weight: 177 lb (80.3 kg)     Body mass index is 21.55 kg/(m^2).    Physical Exam   Constitutional: He is oriented to person, place, and time. He appears well-developed and well-nourished. No distress.   HENT:   Head: Normocephalic and atraumatic.   Right Ear: External ear normal.   Left Ear: External ear normal.   Nose: Nose normal.   Mouth/Throat: Oropharynx is clear and moist.   Eyes: Conjunctivae and EOM are normal. Right eye exhibits no discharge. Left eye exhibits no discharge. No scleral icterus.   Neck: Normal range of motion. Neck supple. No thyromegaly present.   No supraclavicular adenopathy   Cardiovascular: Normal rate, regular rhythm, normal heart sounds and intact distal pulses.  Exam reveals no gallop.     No murmur heard.  No lower extremity edema   Pulmonary/Chest: Effort normal and breath sounds normal. No respiratory distress. He has no wheezes.   Abdominal: Soft. Normal appearance and bowel sounds are normal. He exhibits no distension and no mass. There is no hepatosplenomegaly. There is no tenderness. There is no rigidity, no rebound and no guarding.   Genitourinary:   Genitourinary Comments: Rectal exam deferred   Musculoskeletal: Normal range of motion. He exhibits no edema or tenderness.   No atrophy of upper or lower extremities.  Normal digits and nails of both hands.   Lymphadenopathy:     He has no cervical adenopathy.   Neurological: He is alert and oriented to person, place, and time. He displays no atrophy. Coordination normal.   Skin: Skin is warm and dry. No rash noted. He is not diaphoretic. No erythema.   Psychiatric: He has a normal mood and affect. His behavior is normal. Judgment and thought content normal.   Vitals reviewed.      WBC   Date Value Ref Range Status   09/16/2017 5.71 4.50 - 10.70 10*3/mm3 Final     RBC   Date Value Ref Range Status   09/16/2017 5.75 4.60 - 6.00 10*6/mm3 Final     Hemoglobin   Date Value Ref Range Status   09/16/2017 12.8 (L) 13.7 - 17.6 g/dL Final     Hematocrit   Date Value Ref Range Status   09/16/2017 40.2 (L) 40.4 - 52.2 % Final     MCV   Date Value Ref Range Status   09/16/2017 69.9 (L) 79.8 - 96.2 fL Final     MCH   Date Value Ref Range Status   09/16/2017 22.3 (L) 27.0 - 32.7 pg Final     MCHC   Date Value Ref Range Status   09/16/2017 31.8 (L) 32.6 - 36.4 g/dL Final     RDW   Date Value Ref Range Status   09/16/2017 14.2 11.5 - 14.5 % Final     RDW-SD   Date Value Ref Range Status   09/16/2017 35.8 (L) 37.0 - 54.0 fl Final     MPV   Date Value Ref Range Status   09/16/2017 11.6 6.0 - 12.0 fL Final     Platelets   Date Value Ref Range Status   09/16/2017 242 140 - 500 10*3/mm3 Final     Neutrophil %   Date Value Ref Range Status   09/16/2017 43.6 42.7 -  76.0 % Final     Lymphocyte %   Date Value Ref Range Status   09/16/2017 40.1 19.6 - 45.3 % Final     Monocyte %   Date Value Ref Range Status   09/16/2017 10.9 5.0 - 12.0 % Final     Eosinophil %   Date Value Ref Range Status   09/16/2017 4.9 0.3 - 6.2 % Final     Basophil %   Date Value Ref Range Status   09/16/2017 0.5 0.0 - 1.5 % Final     Immature Grans %   Date Value Ref Range Status   09/16/2017 0.0 0.0 - 0.5 % Final     Neutrophils, Absolute   Date Value Ref Range Status   09/16/2017 2.49 1.90 - 8.10 10*3/mm3 Final     Lymphocytes, Absolute   Date Value Ref Range Status   09/16/2017 2.29 0.90 - 4.80 10*3/mm3 Final     Monocytes, Absolute   Date Value Ref Range Status   09/16/2017 0.62 0.20 - 1.20 10*3/mm3 Final     Eosinophils, Absolute   Date Value Ref Range Status   09/16/2017 0.28 0.00 - 0.70 10*3/mm3 Final     Basophils, Absolute   Date Value Ref Range Status   09/16/2017 0.03 0.00 - 0.20 10*3/mm3 Final     Immature Grans, Absolute   Date Value Ref Range Status   09/16/2017 0.00 0.00 - 0.03 10*3/mm3 Final     nRBC   Date Value Ref Range Status   09/16/2017 0.0 0.0 - 0.0 /100 WBC Final       Glucose   Date Value Ref Range Status   09/16/2017 93 65 - 99 mg/dL Final     Sodium   Date Value Ref Range Status   09/16/2017 143 136 - 145 mmol/L Final     Potassium   Date Value Ref Range Status   09/16/2017 4.2 3.5 - 5.2 mmol/L Final     CO2   Date Value Ref Range Status   09/16/2017 29.9 (H) 22.0 - 29.0 mmol/L Final     Chloride   Date Value Ref Range Status   09/16/2017 102 98 - 107 mmol/L Final     Anion Gap   Date Value Ref Range Status   09/16/2017 11.1 mmol/L Final     Creatinine   Date Value Ref Range Status   09/16/2017 1.04 0.76 - 1.27 mg/dL Final     BUN   Date Value Ref Range Status   09/16/2017 9 6 - 20 mg/dL Final     BUN/Creatinine Ratio   Date Value Ref Range Status   09/16/2017 8.7 7.0 - 25.0 Final     Calcium   Date Value Ref Range Status   09/16/2017 9.6 8.6 - 10.5 mg/dL Final     eGFR Non   Amer   Date Value Ref Range Status   09/16/2017 83 >60 mL/min/1.73 Final     Alkaline Phosphatase   Date Value Ref Range Status   09/16/2017 70 39 - 117 U/L Final     Total Protein   Date Value Ref Range Status   09/16/2017 6.8 6.0 - 8.5 g/dL Final     ALT (SGPT)   Date Value Ref Range Status   09/16/2017 29 1 - 41 U/L Final     AST (SGOT)   Date Value Ref Range Status   09/16/2017 18 1 - 40 U/L Final     Total Bilirubin   Date Value Ref Range Status   09/16/2017 0.4 0.1 - 1.2 mg/dL Final     Albumin   Date Value Ref Range Status   09/16/2017 4.60 3.50 - 5.20 g/dL Final     Globulin   Date Value Ref Range Status   09/16/2017 2.2 gm/dL Final     A/G Ratio   Date Value Ref Range Status   09/16/2017 2.1 g/dL Final         Imaging Results (last 7 days)     ** No results found for the last 168 hours. **            No notes on file    Assessment/Plan    Microcytic anemia- mildly low hemoglobin with microcytic indices.  Differential of iron deficiency anemia versus secondary to thalassemia trait.  Iron 65 on September 16, unfortunately no ferritin level checked.  Denies any overt bleeding    History of peptic ulcer disease- in 2012, associated with NSAID use.    Multiple food allergies- questionably related to his symptoms with eating.  He has noticed some symptomatic improvement with avoiding these foods.  His celiac testing was negative.    Plan  -Check ferritin today.  If his ferritin is low, then he certainly has iron deficiency anemia.  We discussed that EGD and colonoscopy would be the next step in evaluation for this.  If these endoscopy were negative, then hematology evaluation for thalassemia would be next  -check IgA to ensure accuracy of his negative celiac testing  -If symptoms following eating persist, then he may need an allergy evaluation with referral to an allergist        Obdulio was seen today for dizziness.    Diagnoses and all orders for this visit:    Microcytic anemia  -      Ferritin    Multiple food allergies  -     IgA    History of peptic ulcer disease        I have discussed the above plan with the patient.  They verbalize understanding and are in agreement with the plan.  They have been advised to contact the office for any questions, concerns, or changes related to their health.    Dictated utilizing Dragon dictation

## 2017-09-25 NOTE — PATIENT INSTRUCTIONS
Labs today    If iron deficient, will need EGD and colonoscopy before hematology evaluation    Continue the omeprazole and avoiding problem foods    For any additional questions, concerns or changes to your condition after today's office visit please contact the office at [phone].

## 2017-09-26 LAB — IGA SERPL-MCNC: 240 MG/DL (ref 90–386)

## 2017-09-26 NOTE — PROGRESS NOTES
His iron stores or are normal excluding an iron deficiency anemia.  His IgA levels are normal as well which exclude celiac disease.    With see how his symptoms do, if they persist we can send into a allergy specialist

## 2017-09-29 ENCOUNTER — TELEPHONE (OUTPATIENT)
Dept: GASTROENTEROLOGY | Facility: CLINIC | Age: 32
End: 2017-09-29

## 2017-09-29 PROCEDURE — 93227 XTRNL ECG REC<48 HR R&I: CPT | Performed by: INTERNAL MEDICINE

## 2017-09-29 NOTE — TELEPHONE ENCOUNTER
----- Message from Radha David MD sent at 9/26/2017  4:02 PM EDT -----  His iron stores or are normal excluding an iron deficiency anemia.  His IgA levels are normal as well which exclude celiac disease.    With see how his symptoms do, if they persist we can send into a allergy specialist

## 2017-09-29 NOTE — TELEPHONE ENCOUNTER
Called pt and advised per Dr David that his iron store are normal excluding iron def anemia.  His IgA levels are normal as well which exclude celiac disease.  She want to see how he does with his symptoms and if they persist will send to allergy specialist.     Pt verb understanding and reports he is still having symptoms.  He states he has met his deductable and would like to go ahead and see the allergist.  Advised would send message to Dr David.  Pt verb understanding.

## 2017-10-02 DIAGNOSIS — Z91.018 MULTIPLE FOOD ALLERGIES: Primary | ICD-10-CM

## 2017-12-13 ENCOUNTER — OFFICE VISIT (OUTPATIENT)
Dept: ORTHOPEDIC SURGERY | Facility: CLINIC | Age: 32
End: 2017-12-13

## 2017-12-13 DIAGNOSIS — S92.354K CLOSED NONDISPLACED FRACTURE OF FIFTH METATARSAL BONE OF RIGHT FOOT WITH NONUNION, SUBSEQUENT ENCOUNTER: ICD-10-CM

## 2017-12-13 DIAGNOSIS — Z87.81 H/O FRACTURE OF FOOT: Primary | ICD-10-CM

## 2017-12-13 PROCEDURE — 99213 OFFICE O/P EST LOW 20 MIN: CPT | Performed by: ORTHOPAEDIC SURGERY

## 2017-12-13 PROCEDURE — 73620 X-RAY EXAM OF FOOT: CPT | Performed by: ORTHOPAEDIC SURGERY

## 2017-12-13 NOTE — PROGRESS NOTES
"Chief Complaint   Patient presents with   • Right Foot - Follow-up           HPI The patient is here today for a follow up of his right foot fracture. He is doing a lot better than before.  The swelling and bruising has almost completely subsided.  He does not have any clinical deformity.  He is walking almost completely normally.  He has tried to jog likely and does not have any issues with that.  He has use the bone stimulator which has helped him to consolidated this Powell fracture which can be hard to heal.  Patient states that he is very pleased with his outcome and \"my fracture is doing great\".        There were no vitals filed for this visit.        Review of Systems   Constitutional: Negative.    HENT: Negative.    Eyes: Negative.    Respiratory: Negative.    Cardiovascular: Negative.    Gastrointestinal: Negative.    Endocrine: Negative.    Genitourinary: Negative.    Musculoskeletal: Negative.    Skin: Negative.    Allergic/Immunologic: Negative.    Neurological: Negative.    Hematological: Negative.    Psychiatric/Behavioral: Negative.            Physical Exam   Constitutional: He is oriented to person, place, and time. He appears well-nourished.   Eyes: EOM are normal.   Neck: Neck supple.   Cardiovascular: Normal rate, regular rhythm, normal heart sounds and intact distal pulses.    Pulmonary/Chest: Effort normal and breath sounds normal.   Abdominal: Bowel sounds are normal.   Musculoskeletal: He exhibits edema and tenderness.   Neurological: He is alert and oriented to person, place, and time. He has normal reflexes.   Skin: Skin is dry.   Psychiatric: He has a normal mood and affect. His behavior is normal. Judgment and thought content normal.   Nursing note and vitals reviewed.          Joint/Body Part Specific Exam:  Right foot: The tenderness over the base of the fifth metatarsal is completely subsided.  There is no clinical deformity.  Lisfranc's joint is stable.  Dorsiflexion 0-20°.  Plantar " flexion 0-40°.  Patient is able to circumduct his ankle without any difficulty.  The syndesmosis is stable.  Dorsalis pedis artery pulses are palpable.  Neurovascular status is intact.  There is no evidence of an RSD.  His gait is completely normal.  The skin and soft tissue swelling and bruising has fully resolved      X-RAY Report:  right Ankle X-Ray  Indication: Evaluation of healing of the fifth metatarsal base fracture  Views: AP, Lateral  Findings: Completely healed fracture with full consolidation.  yes fracture  no bony lesion  Soft tissues within normal limits  within normal limits joint spaces  Hardware appropriately positioned not applicable    yes prior studies available for comparison.    X-RAY was ordered and reviewed by Jonnie Abdi MD        Diagnostics:        Obdulio was seen today for follow-up.    Diagnoses and all orders for this visit:    H/O fracture of foot  -     XR Foot 2 View Right            Procedures        Plan:  Weightbearing as tolerated.    Refracture precautions.    Wean off the cam walking boot and it is okay to use an active ankle brace to support the fracture and the soft tissues and to allow them to heal completely.    Appropriate footwear and shoes discussed with the patient.    Calcium and vitamin D for bone health.    Follow-up in my office on a when necessary basis.    Tablet ibuprofen 600 mg orally daily at bedtime for pain and discomfort

## 2018-01-16 ENCOUNTER — HOSPITAL ENCOUNTER (EMERGENCY)
Facility: HOSPITAL | Age: 33
Discharge: HOME | End: 2018-01-16
Attending: EMERGENCY MEDICINE | Admitting: EMERGENCY MEDICINE

## 2018-01-16 VITALS
OXYGEN SATURATION: 97 % | BODY MASS INDEX: 21.92 KG/M2 | SYSTOLIC BLOOD PRESSURE: 123 MMHG | HEIGHT: 76 IN | TEMPERATURE: 97.6 F | HEART RATE: 63 BPM | RESPIRATION RATE: 16 BRPM | WEIGHT: 180 LBS | DIASTOLIC BLOOD PRESSURE: 86 MMHG

## 2018-01-16 DIAGNOSIS — S61.412A LACERATION OF LEFT HAND WITHOUT FOREIGN BODY, INITIAL ENCOUNTER: Primary | ICD-10-CM

## 2018-01-16 PROCEDURE — 25010000002 TDAP 5-2.5-18.5 LF-MCG/0.5 SUSPENSION: Performed by: EMERGENCY MEDICINE

## 2018-01-16 PROCEDURE — 99282 EMERGENCY DEPT VISIT SF MDM: CPT

## 2018-01-16 PROCEDURE — 90715 TDAP VACCINE 7 YRS/> IM: CPT | Performed by: EMERGENCY MEDICINE

## 2018-01-16 PROCEDURE — 90471 IMMUNIZATION ADMIN: CPT | Performed by: EMERGENCY MEDICINE

## 2018-01-16 RX ORDER — LIDOCAINE HYDROCHLORIDE AND EPINEPHRINE 10; 10 MG/ML; UG/ML
10 INJECTION, SOLUTION INFILTRATION; PERINEURAL ONCE
Status: COMPLETED | OUTPATIENT
Start: 2018-01-16 | End: 2018-01-16

## 2018-01-16 RX ADMIN — TETANUS TOXOID, REDUCED DIPHTHERIA TOXOID AND ACELLULAR PERTUSSIS VACCINE, ADSORBED 0.5 ML: 5; 2.5; 8; 8; 2.5 SUSPENSION INTRAMUSCULAR at 19:58

## 2018-01-16 RX ADMIN — LIDOCAINE HYDROCHLORIDE AND EPINEPHRINE 3 ML: 10; 10 INJECTION, SOLUTION INFILTRATION; PERINEURAL at 19:25

## 2018-01-16 NOTE — ED PROVIDER NOTES
EMERGENCY DEPARTMENT ENCOUNTER    CHIEF COMPLAINT  Chief Complaint: laceration  History given by: patient, parents  History limited by: nothing  Room Number: 46/46  PMD: Hero Gill MD      HPI:  Pt is a 32 y.o. male who presents complaining of a laceration to his left hand that occurred PTA. Pt states that he was building a cross for his nephew's Yarsani when the end of the chisel went into his left hand. Pt denies numbness or additional injury. Pt is unsure when his tetanus was last updated.     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 No pertinent surgical history.    FAMILY HISTORY  History reviewed. No pertinent family history.    SOCIAL HISTORY  Social History     Social History   • Marital status: Single     Spouse name: N/A   • Number of children: N/A   • Years of education: N/A     Occupational History   • Not on file.     Social History Main Topics   • Smoking status: Never Smoker   • Smokeless tobacco: Never Used   • Alcohol use Yes      Comment: occ   • Drug use: No   • Sexual activity: Not on file     Other Topics Concern   • Not on file     Social History Narrative       ALLERGIES  Sulfa antibiotics    REVIEW OF SYSTEMS  Review  of Systems   Constitutional: Negative for fever.   Respiratory: Negative for shortness of breath.    Cardiovascular: Negative for chest pain.   Skin: Positive for wound (laceration).       PHYSICAL EXAM  ED Triage Vitals   Temp Heart Rate Resp BP SpO2   01/16/18 1846 01/16/18 1846 01/16/18 1846 01/16/18 1853 01/16/18 1846   97.6 °F (36.4 °C) 63 16 123/86 97 %      Temp src Heart Rate Source Patient Position BP Location FiO2 (%)   01/16/18 1846 01/16/18 1846 -- -- --   Oral Monitor          Physical Exam   Constitutional: No distress.   HENT:   Head: Normocephalic and atraumatic.   Eyes: EOM are normal.   Neck: Normal range of motion.   Cardiovascular: Normal heart sounds.    Pulmonary/Chest: No respiratory distress.   Abdominal: There is no tenderness.   Musculoskeletal: He exhibits no edema.        Left hand: He exhibits normal range of motion and no tenderness. Normal sensation noted.   2.5cm laceration to webbing between the left thumb and index finger   Neurological: He is alert.   Skin: Skin is warm and dry.   Nursing note and vitals reviewed.    PROCEDURES  Laceration Repair  Date/Time: 1/16/2018 7:15 PM  Performed by: ENID MEJÍA  Authorized by: NEID MEJÍA     Consent:     Consent obtained:  Verbal    Consent given by:  Patient  Anesthesia (see MAR for exact dosages):     Anesthesia method:  Local infiltration    Local anesthetic:  Lidocaine 1% WITH epi  Laceration details:     Location:  Hand    Hand location: webbing between left thumb and index finger.  Repair type:     Repair type:  Simple  Pre-procedure details:     Preparation:  Patient was prepped and draped in usual sterile fashion  Exploration:     Wound exploration: wound explored through full range of motion and entire depth of wound probed and visualized      Wound extent: no foreign bodies/material noted, no muscle damage noted, no tendon damage noted, no underlying fracture noted and no vascular damage noted      Contaminated: yes     Treatment:     Area cleansed with:  Hibiclens    Amount of cleaning:  Standard    Irrigation solution:  Sterile saline    Irrigation method:  Syringe    Visualized foreign bodies/material removed: no    Skin repair:     Repair method:  Sutures    Suture size:  5-0    Suture material:  Nylon    Suture technique:  Simple interrupted    Number of sutures:  4  Approximation:     Approximation:  Close  Post-procedure details:     Dressing:  Antibiotic ointment and non-adherent dressing    Patient tolerance of procedure:  Tolerated well, no immediate complications            PROGRESS AND CONSULTS  ED Course     1912- Ordered TDap for tetanus prevention.    1914- Rechecked pt. Pt is resting comfortably. Sutured pt's laceration.    1926- Pt tolerated suturing well. Discussed the plan to discharge the pt home with instructions to follow up with his PMD in 7-10 days for suture removal. RTER warning given for fever, signs of infection or any concerns. Pt agrees with the plan and all questions were addressed.    MEDICAL DECISION MAKING  Results were reviewed/discussed with the patient and they were also made aware of online access. Pt also made aware that follow up with PMD is necessary.     MDM  Number of Diagnoses or Management Options  Laceration of left hand without foreign body, initial encounter:      Amount and/or Complexity of Data Reviewed  Decide to obtain previous medical records or to obtain history from someone other than the patient: yes  Obtain history from someone other than the patient: yes (parents)  Review and summarize past medical records: yes (Pt was last seen in the ED in September 2017 for dizziness.)    Patient Progress  Patient progress: stable         DIAGNOSIS  Final diagnoses:   Laceration of left hand without foreign body, initial encounter       DISPOSITION  DISCHARGE    Patient discharged in stable condition.    Reviewed implications of results, diagnosis, meds, responsibility to follow up,  warning signs and symptoms of possible worsening, potential complications and reasons to return to ER, including fever, worsening pain or any concerns.    Patient/Family voiced understanding of above instructions.    Discussed plan for discharge, as there is no emergent indication for admission.  Pt/family is agreeable and understands need for follow up and repeat testing.  Pt is aware that discharge does not mean that nothing is wrong but it indicates no emergency is present that requires admission and they must continue care with follow-up as given below or physician of their choice.     FOLLOW-UP  Hero Gill MD  [address] [facility] [address] [phone]    Call  in 7-10 days for suture removal         Medication List      Stop          pediatric multivitamin-iron solution               Latest Documented Vital Signs:  As of 7:32 PM  BP- 123/86 HR- 63 Temp- 97.6 °F (36.4 °C) (Oral) O2 sat- 97%    --  Documentation assistance provided by vicki Borges for Dr. Enrique.  Information recorded by the scribe was done at my direction and has been verified and validated by me.     Snehal Borges  01/16/18 1932       Joe Enrique MD  01/16/18 1158

## 2018-01-17 NOTE — DISCHARGE INSTRUCTIONS
Keep your laceration dry for the next 24 hours. Use antibiotic ointment on the laceration. Do not submerge your left hand in water.

## 2018-01-18 ENCOUNTER — TELEPHONE (OUTPATIENT)
Dept: SOCIAL WORK | Facility: HOSPITAL | Age: 33
End: 2018-01-18

## 2018-01-18 NOTE — TELEPHONE ENCOUNTER
ER F/U phone call:  Pt states that he is doing well.  Stitches in hand are healing well. Advised pt to make an appointment with his PCP. No other questions or concerns voiced at this time. Jocelyn Ritter RN

## 2019-03-06 ENCOUNTER — OFFICE VISIT (OUTPATIENT)
Dept: FAMILY MEDICINE CLINIC | Facility: CLINIC | Age: 34
End: 2019-03-06

## 2019-03-06 VITALS
TEMPERATURE: 97.7 F | WEIGHT: 182 LBS | HEART RATE: 51 BPM | BODY MASS INDEX: 22.16 KG/M2 | OXYGEN SATURATION: 99 % | SYSTOLIC BLOOD PRESSURE: 124 MMHG | HEIGHT: 76 IN | DIASTOLIC BLOOD PRESSURE: 80 MMHG

## 2019-03-06 DIAGNOSIS — Z00.00 ENCOUNTER FOR PREVENTIVE HEALTH EXAMINATION: Primary | ICD-10-CM

## 2019-03-06 PROCEDURE — 99395 PREV VISIT EST AGE 18-39: CPT | Performed by: INTERNAL MEDICINE

## 2019-03-06 RX ORDER — ACYCLOVIR 200 MG/1
200 CAPSULE ORAL 3 TIMES DAILY
Qty: 21 CAPSULE | Refills: 3 | Status: SHIPPED | OUTPATIENT
Start: 2019-03-06 | End: 2020-07-29 | Stop reason: SDUPTHER

## 2019-03-06 RX ORDER — OMEPRAZOLE 20 MG/1
20 CAPSULE, DELAYED RELEASE ORAL DAILY
COMMUNITY

## 2019-03-06 NOTE — PROGRESS NOTES
Subjective Complaint is a checkup.  Obdulio Rajan Jr. is a 33 y.o. male.     History of Present Illness   Orly is here today for checkup.  Since I last saw him he was found to have allergies to soy, wheat, and peanuts.  He has eliminated peanuts and wheat from his diet.  His stomach symptoms have gotten considerably better.  He is still taking some omeprazole in the over-the-counter 20 mg strength.  He is wondering whether he could stop taking that.  He is very active in terms of outdoor activities.  He did not get a flu shot this year.  I did advise him that some were still available at the pharmacies.    The following portions of the patient's history were reviewed and updated as appropriate: allergies, current medications, past family history, past medical history, past social history, past surgical history and problem list.    Review of Systems   Constitutional: Negative for activity change, appetite change, unexpected weight gain and unexpected weight loss.   Respiratory: Negative for chest tightness and shortness of breath.    Cardiovascular: Negative for chest pain and leg swelling.   Gastrointestinal: Negative for abdominal pain and blood in stool.   Genitourinary: Negative for dysuria and hematuria.   Neurological: Negative for dizziness, light-headedness and headache.   Psychiatric/Behavioral: Negative for dysphoric mood and depressed mood.       Objective   Physical Exam   Constitutional: He is oriented to person, place, and time. He appears well-developed and well-nourished.   HENT:   Head: Normocephalic and atraumatic.   Eyes: Conjunctivae are normal. No scleral icterus.   Neck: Carotid bruit is not present. No thyromegaly present.   Cardiovascular: Normal rate, regular rhythm, normal heart sounds and intact distal pulses. Exam reveals no gallop and no friction rub.   No murmur heard.  Pulmonary/Chest: Effort normal and breath sounds normal. No respiratory distress. He has no wheezes. He has no  rales.   Abdominal: Soft. Bowel sounds are normal. He exhibits no distension and no mass. There is no tenderness. There is no guarding.   Musculoskeletal: He exhibits no edema.   Lymphadenopathy:     He has no cervical adenopathy.   Neurological: He is alert and oriented to person, place, and time. No cranial nerve deficit.   Skin: Skin is warm and dry.   Psychiatric: He has a normal mood and affect.   Nursing note and vitals reviewed.        Assessment/Plan   Obdulio was seen today for follow-up.    Diagnoses and all orders for this visit:    Encounter for preventive health examination  -     Comprehensive Metabolic Panel  -     Lipid Panel    Karan is here today for checkup.  He seems to be doing well.  We did discuss possibly coming off of the omeprazole.  He can always restart it if his symptoms recur.  I am going to check a chemistry panel and lipid panel today.  If all is well he can see me on an as-needed basis.

## 2019-03-07 LAB
ALBUMIN SERPL-MCNC: 4.6 G/DL (ref 3.5–5.2)
ALBUMIN/GLOB SERPL: 1.8 G/DL
ALP SERPL-CCNC: 77 U/L (ref 39–117)
ALT SERPL-CCNC: 39 U/L (ref 1–41)
AST SERPL-CCNC: 22 U/L (ref 1–40)
BILIRUB SERPL-MCNC: 0.5 MG/DL (ref 0.1–1.2)
BUN SERPL-MCNC: 14 MG/DL (ref 6–20)
BUN/CREAT SERPL: 12.7 (ref 7–25)
CALCIUM SERPL-MCNC: 9.6 MG/DL (ref 8.6–10.5)
CHLORIDE SERPL-SCNC: 103 MMOL/L (ref 98–107)
CHOLEST SERPL-MCNC: 142 MG/DL (ref 0–200)
CO2 SERPL-SCNC: 28.9 MMOL/L (ref 22–29)
CREAT SERPL-MCNC: 1.1 MG/DL (ref 0.76–1.27)
GLOBULIN SER CALC-MCNC: 2.5 GM/DL
GLUCOSE SERPL-MCNC: 93 MG/DL (ref 65–99)
HDLC SERPL-MCNC: 48 MG/DL (ref 40–60)
LDLC SERPL CALC-MCNC: 78 MG/DL (ref 0–100)
POTASSIUM SERPL-SCNC: 4.4 MMOL/L (ref 3.5–5.2)
PROT SERPL-MCNC: 7.1 G/DL (ref 6–8.5)
SODIUM SERPL-SCNC: 142 MMOL/L (ref 136–145)
TRIGL SERPL-MCNC: 78 MG/DL (ref 0–150)
VLDLC SERPL CALC-MCNC: 15.6 MG/DL (ref 5–40)

## 2020-01-24 ENCOUNTER — OFFICE VISIT (OUTPATIENT)
Dept: FAMILY MEDICINE CLINIC | Facility: CLINIC | Age: 35
End: 2020-01-24

## 2020-01-24 VITALS
HEIGHT: 76 IN | SYSTOLIC BLOOD PRESSURE: 110 MMHG | OXYGEN SATURATION: 98 % | TEMPERATURE: 98 F | DIASTOLIC BLOOD PRESSURE: 70 MMHG | BODY MASS INDEX: 22.29 KG/M2 | HEART RATE: 72 BPM | WEIGHT: 183 LBS

## 2020-01-24 DIAGNOSIS — K21.9 GASTROESOPHAGEAL REFLUX DISEASE, ESOPHAGITIS PRESENCE NOT SPECIFIED: ICD-10-CM

## 2020-01-24 DIAGNOSIS — J06.9 URI WITH COUGH AND CONGESTION: Primary | ICD-10-CM

## 2020-01-24 PROCEDURE — 99213 OFFICE O/P EST LOW 20 MIN: CPT | Performed by: INTERNAL MEDICINE

## 2020-01-24 NOTE — PROGRESS NOTES
Subjective Chief complaint is cough and congestion  Obdulio Rajan Jr. is a 34 y.o. male.     History of Present Illness   Orly is here today for cough and congestion.  His symptoms began approximately 2 weeks ago.  He initially had some congestion and some discolored drainage.  However that seemed to resolve.  He now has a sore throat primarily on the right side.  It hurts a little bit to swallow but does not feel like prior strep throats.  He also is having a little ear discomfort on that side.  He is not having fever chills or flulike symptoms.  Additionally he had a question about long-term use of Prilosec.  His fiancée is studying to be a nurse practitioner and had some questions about it.  The following portions of the patient's history were reviewed and updated as appropriate: allergies, current medications, past family history, past medical history, past social history, past surgical history and problem list.    Review of Systems   Constitutional: Negative for chills and fever.   HENT: Positive for congestion, ear discharge, ear pain, sore throat and trouble swallowing. Negative for drooling and swollen glands.    Respiratory: Positive for cough. Negative for shortness of breath and stridor.    Gastrointestinal: Negative for abdominal pain, diarrhea and vomiting.   Musculoskeletal: Negative for myalgias and neck pain.       Objective   Physical Exam   Constitutional: He appears well-developed and well-nourished.   HENT:   Tympanic membranes are normal.  There is some bilateral nasal congestion but no significant erythema and rhinorrhea is clear.  Oropharynx shows some erythema of the right tonsil but no exudate.   Pulmonary/Chest: Effort normal and breath sounds normal.   Lymphadenopathy:     He has no cervical adenopathy.   Nursing note and vitals reviewed.        Assessment/Plan   Obdulio was seen today for cough, nasal congestion and sore throat.    Diagnoses and all orders for this visit:    URI  with cough and congestion    Orly is here today for respiratory symptoms.  I suspect this is a viral respiratory infection.  His oxygen level looks good.  I do not see anything that looks like strep.  I did advise him to use Mucinex DM for congestion and cough and some Advil for the sore throat.  He should also maybe try some warm salt water gargles.  I did advise the patient of any association between proton pump inhibitors and links to dementia as well as kidney disease and heart problems.  However I think these are more associations rather than cause-and-effect.  I did advise him he may want to try using some famotidine and see if that will hold things just as well.

## 2020-03-05 ENCOUNTER — OFFICE VISIT (OUTPATIENT)
Dept: FAMILY MEDICINE CLINIC | Facility: CLINIC | Age: 35
End: 2020-03-05

## 2020-03-05 VITALS
HEART RATE: 56 BPM | OXYGEN SATURATION: 99 % | RESPIRATION RATE: 20 BRPM | BODY MASS INDEX: 23.36 KG/M2 | HEIGHT: 76 IN | TEMPERATURE: 97.8 F | SYSTOLIC BLOOD PRESSURE: 112 MMHG | WEIGHT: 191.8 LBS | DIASTOLIC BLOOD PRESSURE: 86 MMHG

## 2020-03-05 DIAGNOSIS — J34.89 INTERNAL NASAL LESION: Primary | ICD-10-CM

## 2020-03-05 PROCEDURE — 99213 OFFICE O/P EST LOW 20 MIN: CPT | Performed by: INTERNAL MEDICINE

## 2020-03-05 NOTE — PROGRESS NOTES
Answers for HPI/ROS submitted by the patient on 3/5/2020   What is the primary reason for your visit?: Other  Please describe your symptoms.: nasal polyp  Have you had these symptoms before?: No  How long have you been having these symptoms?: past week  Please list any medications you are currently taking for this condition.: zyrtec  Subjective Complaint is possible nasal polyp  Obdulio Rajan Jr. is a 34 y.o. male.     History of Present Illness   Orly is here today for possible nasal polyp.  He first noticed this within the last week.  He felt a hard or firm spot on the lower nasal septum.  There is been no pain.  There is no associated fever or chills.  The following portions of the patient's history were reviewed and updated as appropriate: allergies, current medications, past family history, past medical history, past social history, past surgical history and problem list.    Review of Systems   Constitutional: Negative for chills and fever.   HENT: Negative for facial swelling and sinus pressure.        Objective   Physical Exam   HENT:   In the right nares there was extremely dry almost epithelialized mucus that I removed with the speculum.  There was some irritation beneath this but certainly there is no polyp.   Nursing note and vitals reviewed.        Assessment/Plan   Obdulio was seen today for foreign body in nose.    Diagnoses and all orders for this visit:    Internal nasal lesion    Orly is here today for possible nasal polyp.  I certainly did not see a nasal polyp.  I did remove a almost epithelialized mucous plug from the septum.  There was some irritation beneath this but it looks like it should heal with some saline.

## 2020-07-29 RX ORDER — ACYCLOVIR 200 MG/1
200 CAPSULE ORAL 3 TIMES DAILY
Qty: 21 CAPSULE | Refills: 3 | Status: SHIPPED | OUTPATIENT
Start: 2020-07-29 | End: 2022-10-20

## 2020-07-29 RX ORDER — ACYCLOVIR 200 MG/1
CAPSULE ORAL
Qty: 21 CAPSULE | Refills: 2 | OUTPATIENT
Start: 2020-07-29

## 2020-07-29 NOTE — TELEPHONE ENCOUNTER
Caller: Obdulio Rajan Jr.    Relationship: Self    Best call back number: [phone]     Medication needed:   Requested Prescriptions     Pending Prescriptions Disp Refills   • acyclovir (ZOVIRAX) 200 MG capsule 21 capsule 3     Sig: Take 1 capsule by mouth 3 (Three) Times a Day. As needed for fever blisters       When do you need the refill by: 20     What details did the patient provide when requesting the medication: PATIENT STATED THE BOTTLE HE HAD STILL HAD REFILLS ON IT BUT THEY ARE  AS OF 2020 SINCE IT'S AN AS NEEDED MEDICATION. PATIENT TOOK HIS LAST PILL TODAY 20 AND IS HAVING A BAD BREAKOUT RIGHT NOW SO HE WOULD LIKE IT REFILLED AS SOON AS POSSIBLE.     Does the patient have less than a 3 day supply:  [x] Yes  [] No    What is the patient's preferred pharmacy: [facility] [address] [facility] [address] [facility] [phone] [facility] [phone] FX

## 2020-08-04 ENCOUNTER — TELEPHONE (OUTPATIENT)
Dept: FAMILY MEDICINE CLINIC | Facility: CLINIC | Age: 35
End: 2020-08-04

## 2021-04-16 ENCOUNTER — BULK ORDERING (OUTPATIENT)
Dept: CASE MANAGEMENT | Facility: OTHER | Age: 36
End: 2021-04-16

## 2021-04-16 DIAGNOSIS — Z23 IMMUNIZATION DUE: ICD-10-CM

## 2021-12-03 ENCOUNTER — IMMUNIZATION (OUTPATIENT)
Dept: VACCINE CLINIC | Facility: HOSPITAL | Age: 36
End: 2021-12-03

## 2021-12-03 PROCEDURE — 91300 HC SARSCOV02 VAC 30MCG/0.3ML IM: CPT | Performed by: INTERNAL MEDICINE

## 2021-12-03 PROCEDURE — 0004A HC ADM SARSCOV2 30MCG/0.3ML BOOSTER: CPT | Performed by: INTERNAL MEDICINE

## 2022-10-20 ENCOUNTER — OFFICE VISIT (OUTPATIENT)
Dept: GASTROENTEROLOGY | Facility: CLINIC | Age: 37
End: 2022-10-20

## 2022-10-20 ENCOUNTER — PREP FOR SURGERY (OUTPATIENT)
Dept: SURGERY | Facility: SURGERY CENTER | Age: 37
End: 2022-10-20

## 2022-10-20 VITALS
BODY MASS INDEX: 20.65 KG/M2 | DIASTOLIC BLOOD PRESSURE: 98 MMHG | HEIGHT: 76 IN | OXYGEN SATURATION: 98 % | WEIGHT: 169.6 LBS | HEART RATE: 59 BPM | TEMPERATURE: 98.6 F | SYSTOLIC BLOOD PRESSURE: 122 MMHG

## 2022-10-20 DIAGNOSIS — R19.5 LOOSE STOOLS: ICD-10-CM

## 2022-10-20 DIAGNOSIS — R14.0 BLOATING: ICD-10-CM

## 2022-10-20 DIAGNOSIS — R11.0 NAUSEA: ICD-10-CM

## 2022-10-20 DIAGNOSIS — R10.30 LOWER ABDOMINAL PAIN: Primary | ICD-10-CM

## 2022-10-20 DIAGNOSIS — Z87.11 HISTORY OF PEPTIC ULCER DISEASE: ICD-10-CM

## 2022-10-20 PROCEDURE — 99204 OFFICE O/P NEW MOD 45 MIN: CPT | Performed by: NURSE PRACTITIONER

## 2022-10-20 RX ORDER — CETIRIZINE HYDROCHLORIDE 5 MG/1
5 TABLET ORAL DAILY
COMMUNITY

## 2022-10-20 RX ORDER — SODIUM CHLORIDE, SODIUM LACTATE, POTASSIUM CHLORIDE, CALCIUM CHLORIDE 600; 310; 30; 20 MG/100ML; MG/100ML; MG/100ML; MG/100ML
30 INJECTION, SOLUTION INTRAVENOUS CONTINUOUS PRN
Status: CANCELLED | OUTPATIENT
Start: 2022-10-20

## 2022-10-20 RX ORDER — ONDANSETRON 4 MG/1
TABLET, ORALLY DISINTEGRATING ORAL
COMMUNITY
Start: 2022-10-12

## 2022-10-20 RX ORDER — BUSPIRONE HYDROCHLORIDE 5 MG/1
5 TABLET ORAL 3 TIMES DAILY PRN
COMMUNITY
Start: 2022-10-01

## 2022-10-20 RX ORDER — CHOLECALCIFEROL (VITAMIN D3) 125 MCG
5 CAPSULE ORAL
COMMUNITY

## 2022-10-20 RX ORDER — FAMOTIDINE 20 MG/1
20 TABLET, FILM COATED ORAL
COMMUNITY
Start: 2022-10-11

## 2022-10-20 RX ORDER — SODIUM CHLORIDE 0.9 % (FLUSH) 0.9 %
10 SYRINGE (ML) INJECTION AS NEEDED
Status: CANCELLED | OUTPATIENT
Start: 2022-10-20

## 2022-10-20 RX ORDER — SODIUM CHLORIDE 0.9 % (FLUSH) 0.9 %
3 SYRINGE (ML) INJECTION EVERY 12 HOURS SCHEDULED
Status: CANCELLED | OUTPATIENT
Start: 2022-10-20

## 2022-10-20 RX ORDER — DICYCLOMINE HCL 20 MG
TABLET ORAL
COMMUNITY
Start: 2022-08-23 | End: 2023-01-12

## 2022-10-20 NOTE — PROGRESS NOTES
Chief Complaint   Patient presents with   • GI Problem         History of Present Illness  Patient is a 37-year-old male who presents today for evaluation.    Patient reports a longstanding history of GI symptoms which include abdominal cramping, bloating, indigestion, nausea, and loose stools.      Reports in 2014 he was diagnosed with a bleeding ulcer.  This was attributed to stress, NSAIDs, and regular alcohol consumption.    Reports in 2018 he was diagnosed with a wheat allergy through blood work and skin testing.  Serologic testing for celiac disease at the time was negative.  He was also found to be mildly reactive to peanuts and soy.    He reports it was felt that anxiety could be contributing to his symptoms and he was given prescription for dicyclomine which helped somewhat and then BuSpar which worked well for period of time.      He has however continued to experience episodic symptoms.  He recently had exacerbated symptoms that led to an emergency room visit where lab work was performed and CT scan was performed, results from CT are not available for review at this time.  Lab work shows mild anemia.    Patient reports when he has symptoms he generally experiences cramping present to his lower abdomen, nausea, and at times has loose stools, generally 1-2 bowel movements per day.  He denies any blood or mucus in the stool.  With his recent episode of symptoms he also noted a pressure in his throat.    He had an EGD that diagnosed the ulcer in 2014.  He has never had a colonoscopy.    He has had some weight loss with the symptoms recently, down around 5 to 7 pounds.    Per review of previous testing, anemia appears to date back to at least 2017.  He reports a family history of beta thalassemia but he has never been tested for this.    No prior abdominal surgeries.  Reports his mother has a longstanding history of GI issues but no diagnosis that he is aware of.     Result Review :       Records reviewed as  "summarized in HPI.    Vital Signs:   /98   Pulse 59   Temp 98.6 °F (37 °C)   Ht 193 cm (76\")   Wt 76.9 kg (169 lb 9.6 oz)   SpO2 98%   BMI 20.64 kg/m²     Body mass index is 20.64 kg/m².     Physical Exam  Vitals reviewed.   Constitutional:       General: He is not in acute distress.     Appearance: He is well-developed.   HENT:      Head: Normocephalic and atraumatic.   Pulmonary:      Effort: Pulmonary effort is normal. No respiratory distress.   Abdominal:      General: Abdomen is flat. Bowel sounds are normal. There is no distension.      Palpations: Abdomen is soft.      Tenderness: There is no abdominal tenderness.   Skin:     General: Skin is dry.      Coloration: Skin is not pale.   Neurological:      Mental Status: He is alert and oriented to person, place, and time.   Psychiatric:         Thought Content: Thought content normal.           Assessment and Plan    Diagnoses and all orders for this visit:    1. Lower abdominal pain (Primary)    2. Nausea    3. Bloating    4. Loose stools    5. History of peptic ulcer disease         Discussion  Patient presents today for evaluation of worsening GI symptoms with abdominal pain stools.  Due to duration of symptoms, weight loss, and anemia, recommend EGD and colonoscopy for further evaluation.  EGD to assess for any evidence of persistent peptic ulcer disease, gastritis, H. pylori infection and celiac disease however discussed with patient as he avoids wheat due to his allergy, assessment for celiac may not be diagnostic.  Recommended colonoscopy to evaluate for any evidence of inflammatory bowel disease.  If negative, symptoms likely secondary to irritable bowel syndrome and will continue symptomatic management.  We also may consider hydrogen breath test to assess for small intestinal bacterial overgrowth which could be contributing to patient's symptoms.  May consider treatment with Xifaxan if symptoms worsen prior to endoscopic " evaluation.          Follow Up   Return for Follow up to review results after testing complete.    Patient Instructions   Schedule EGD and colonoscopy for further evaluation.

## 2022-11-03 ENCOUNTER — TELEPHONE (OUTPATIENT)
Dept: GASTROENTEROLOGY | Facility: CLINIC | Age: 37
End: 2022-11-03

## 2022-11-03 DIAGNOSIS — K76.9 LIVER LESION: Primary | ICD-10-CM

## 2022-11-03 NOTE — TELEPHONE ENCOUNTER
Please let patient know that I received his CT scan for review.  It showed a liver lesion but was otherwise normal.  The radiologist recommended a nonemergent liver MRI to follow-up on this.  I placed orders for this we can get this scheduled at his convenience.  Do not feel this is the source of his symptoms but would recommend following up on it.

## 2022-11-04 ENCOUNTER — TELEPHONE (OUTPATIENT)
Dept: GASTROENTEROLOGY | Facility: CLINIC | Age: 37
End: 2022-11-04

## 2022-11-07 ENCOUNTER — TELEPHONE (OUTPATIENT)
Dept: GASTROENTEROLOGY | Facility: CLINIC | Age: 37
End: 2022-11-07

## 2022-11-28 ENCOUNTER — HOSPITAL ENCOUNTER (OUTPATIENT)
Dept: MRI IMAGING | Facility: HOSPITAL | Age: 37
Discharge: HOME | End: 2022-11-28
Admitting: NURSE PRACTITIONER

## 2022-11-28 DIAGNOSIS — K76.9 LIVER LESION: ICD-10-CM

## 2022-11-28 PROCEDURE — 74183 MRI ABD W/O CNTR FLWD CNTR: CPT

## 2022-11-28 PROCEDURE — 0 GADOBENATE DIMEGLUMINE 529 MG/ML SOLUTION: Performed by: NURSE PRACTITIONER

## 2022-11-28 PROCEDURE — A9577 INJ MULTIHANCE: HCPCS | Performed by: NURSE PRACTITIONER

## 2022-11-28 RX ADMIN — GADOBENATE DIMEGLUMINE 15 ML: 529 INJECTION, SOLUTION INTRAVENOUS at 14:17

## 2022-12-19 RX ORDER — VALACYCLOVIR HYDROCHLORIDE 500 MG/1
500 TABLET, FILM COATED ORAL DAILY
COMMUNITY
Start: 2022-11-21

## 2022-12-19 NOTE — SIGNIFICANT NOTE
Education provided the Patient on the following:    - Nothing to Eat or Drink after MN the night before the procedure    - Avoid red/purple fluids while completing their bowel prep as ordered by physician  -Contact Gastrointerologist office for any questions about specific details regarding colon prep    -You will need to have someone drive you home after your colonoscopy and remain with you for 24 hours after the procedure  - The date of your procedure, your are welcome to have one visitor at bedside or remain within 10-15 minutes of [facility] [place]  -Please wear warm socks when you arrive for your procedure  -Remove all jewelry and leave any valuables before arriving the day of your procedure (all will have to be removed before leaving preop)  -You will need to arrive at 0630 on 12/22 procedure    -Feel free to contact us at: [phone] with any additional questions/concerns

## 2022-12-22 ENCOUNTER — HOSPITAL ENCOUNTER (OUTPATIENT)
Facility: SURGERY CENTER | Age: 37
Setting detail: HOSPITAL OUTPATIENT SURGERY
Discharge: HOME | End: 2022-12-22
Attending: INTERNAL MEDICINE | Admitting: INTERNAL MEDICINE

## 2022-12-22 ENCOUNTER — ANESTHESIA EVENT (OUTPATIENT)
Dept: SURGERY | Facility: SURGERY CENTER | Age: 37
End: 2022-12-22

## 2022-12-22 ENCOUNTER — ANESTHESIA (OUTPATIENT)
Dept: SURGERY | Facility: SURGERY CENTER | Age: 37
End: 2022-12-22

## 2022-12-22 VITALS
OXYGEN SATURATION: 100 % | HEIGHT: 76 IN | HEART RATE: 49 BPM | WEIGHT: 174 LBS | RESPIRATION RATE: 16 BRPM | SYSTOLIC BLOOD PRESSURE: 121 MMHG | DIASTOLIC BLOOD PRESSURE: 84 MMHG | BODY MASS INDEX: 21.19 KG/M2 | TEMPERATURE: 98 F

## 2022-12-22 DIAGNOSIS — R11.0 NAUSEA: ICD-10-CM

## 2022-12-22 DIAGNOSIS — R14.0 BLOATING: ICD-10-CM

## 2022-12-22 DIAGNOSIS — Z87.11 HISTORY OF PEPTIC ULCER DISEASE: ICD-10-CM

## 2022-12-22 DIAGNOSIS — R19.5 LOOSE STOOLS: ICD-10-CM

## 2022-12-22 DIAGNOSIS — R10.30 LOWER ABDOMINAL PAIN: ICD-10-CM

## 2022-12-22 PROCEDURE — 43239 EGD BIOPSY SINGLE/MULTIPLE: CPT | Performed by: INTERNAL MEDICINE

## 2022-12-22 PROCEDURE — 25010000002 PROPOFOL 10 MG/ML EMULSION: Performed by: ANESTHESIOLOGY

## 2022-12-22 PROCEDURE — 45385 COLONOSCOPY W/LESION REMOVAL: CPT | Performed by: INTERNAL MEDICINE

## 2022-12-22 PROCEDURE — 88305 TISSUE EXAM BY PATHOLOGIST: CPT | Performed by: INTERNAL MEDICINE

## 2022-12-22 PROCEDURE — 45380 COLONOSCOPY AND BIOPSY: CPT | Performed by: INTERNAL MEDICINE

## 2022-12-22 RX ORDER — PROPOFOL 10 MG/ML
VIAL (ML) INTRAVENOUS CONTINUOUS PRN
Status: DISCONTINUED | OUTPATIENT
Start: 2022-12-22 | End: 2022-12-22 | Stop reason: SURG

## 2022-12-22 RX ORDER — SODIUM CHLORIDE 0.9 % (FLUSH) 0.9 %
10 SYRINGE (ML) INJECTION AS NEEDED
Status: DISCONTINUED | OUTPATIENT
Start: 2022-12-22 | End: 2022-12-22 | Stop reason: HOSPADM

## 2022-12-22 RX ORDER — LIDOCAINE HYDROCHLORIDE 10 MG/ML
0.5 INJECTION, SOLUTION INFILTRATION; PERINEURAL ONCE AS NEEDED
Status: DISCONTINUED | OUTPATIENT
Start: 2022-12-22 | End: 2022-12-22 | Stop reason: HOSPADM

## 2022-12-22 RX ORDER — SODIUM CHLORIDE 0.9 % (FLUSH) 0.9 %
3 SYRINGE (ML) INJECTION EVERY 12 HOURS SCHEDULED
Status: DISCONTINUED | OUTPATIENT
Start: 2022-12-22 | End: 2022-12-22 | Stop reason: HOSPADM

## 2022-12-22 RX ORDER — MAGNESIUM HYDROXIDE 1200 MG/15ML
LIQUID ORAL AS NEEDED
Status: DISCONTINUED | OUTPATIENT
Start: 2022-12-22 | End: 2022-12-22 | Stop reason: HOSPADM

## 2022-12-22 RX ORDER — SODIUM CHLORIDE, SODIUM LACTATE, POTASSIUM CHLORIDE, CALCIUM CHLORIDE 600; 310; 30; 20 MG/100ML; MG/100ML; MG/100ML; MG/100ML
30 INJECTION, SOLUTION INTRAVENOUS CONTINUOUS PRN
Status: DISCONTINUED | OUTPATIENT
Start: 2022-12-22 | End: 2022-12-22 | Stop reason: HOSPADM

## 2022-12-22 RX ORDER — SODIUM CHLORIDE, SODIUM LACTATE, POTASSIUM CHLORIDE, CALCIUM CHLORIDE 600; 310; 30; 20 MG/100ML; MG/100ML; MG/100ML; MG/100ML
1000 INJECTION, SOLUTION INTRAVENOUS CONTINUOUS
Status: DISCONTINUED | OUTPATIENT
Start: 2022-12-22 | End: 2022-12-22 | Stop reason: HOSPADM

## 2022-12-22 RX ORDER — PROPOFOL 10 MG/ML
VIAL (ML) INTRAVENOUS AS NEEDED
Status: DISCONTINUED | OUTPATIENT
Start: 2022-12-22 | End: 2022-12-22 | Stop reason: SURG

## 2022-12-22 RX ORDER — LIDOCAINE HYDROCHLORIDE 20 MG/ML
INJECTION, SOLUTION INFILTRATION; PERINEURAL AS NEEDED
Status: DISCONTINUED | OUTPATIENT
Start: 2022-12-22 | End: 2022-12-22 | Stop reason: SURG

## 2022-12-22 RX ORDER — ONDANSETRON 2 MG/ML
4 INJECTION INTRAMUSCULAR; INTRAVENOUS ONCE AS NEEDED
Status: DISCONTINUED | OUTPATIENT
Start: 2022-12-22 | End: 2022-12-22 | Stop reason: HOSPADM

## 2022-12-22 RX ADMIN — Medication 250 MCG/KG/MIN: at 08:02

## 2022-12-22 RX ADMIN — Medication 200 MCG/KG/MIN: at 07:50

## 2022-12-22 RX ADMIN — LIDOCAINE HYDROCHLORIDE 50 MG: 20 INJECTION, SOLUTION INFILTRATION; PERINEURAL at 08:02

## 2022-12-22 RX ADMIN — PROPOFOL INJECTABLE EMULSION 120 MG: 10 INJECTION, EMULSION INTRAVENOUS at 08:02

## 2022-12-22 RX ADMIN — SODIUM CHLORIDE, POTASSIUM CHLORIDE, SODIUM LACTATE AND CALCIUM CHLORIDE 1000 ML: 600; 310; 30; 20 INJECTION, SOLUTION INTRAVENOUS at 07:32

## 2022-12-22 NOTE — ANESTHESIA PREPROCEDURE EVALUATION
Anesthesia Evaluation     Patient summary reviewed and Nursing notes reviewed   NPO Solid Status: > 8 hours  NPO Liquid Status: > 2 hours           Airway   Mallampati: I  Dental - normal exam     Comment: Risk of dental injury discussed with patient    Pulmonary - negative pulmonary ROS and normal exam   (-) not a smoker  Cardiovascular - negative cardio ROS and normal exam    ECG reviewed    (-) hypertension, CAD      Neuro/Psych- negative ROS  GI/Hepatic/Renal/Endo    (+)  GERD, PUD,    (-) diabetes    Musculoskeletal (-) negative ROS    Abdominal    Substance History - negative use     OB/GYN          Other - negative ROS                     Anesthesia Plan    ASA 2       Anesthetic plan, risks, benefits, and alternatives have been provided, discussed and informed consent has been obtained with: patient.        CODE STATUS:

## 2022-12-22 NOTE — ANESTHESIA POSTPROCEDURE EVALUATION
"Patient: Obdulio Rajan Jr.    Procedure Summary     Date: 12/22/22 Room / Location: [facility]    Anesthesia Start: 0756 Anesthesia Stop: 0826    Procedures:       ESOPHAGOGASTRODUODENOSCOPY WITH BIOPSY      COLONOSCOPY FOR SCREENING WITH BIOPSY AND POLYPECTOMY Diagnosis:       Lower abdominal pain      Nausea      Bloating      Loose stools      History of peptic ulcer disease      (Lower abdominal pain [R10.30])      (Nausea [R11.0])      (Bloating [R14.0])      (Loose stools [R19.5])      (History of peptic ulcer disease [Z87.11])    Surgeons: Glenn Bro MD Provider: Sera Youssef MD    Anesthesia Type: Not recorded ASA Status: 2          Anesthesia Type: No value filed.    Vitals  Vitals Value Taken Time   /84 12/22/22 0847   Temp 36.7 °C (98 °F) 12/22/22 0825   Pulse 49 12/22/22 0847   Resp 16 12/22/22 0847   SpO2 100 % 12/22/22 0847           Post Anesthesia Care and Evaluation    Patient location during evaluation: PHASE II  Patient participation: complete - patient participated  Level of consciousness: awake  Pain management: adequate    Airway patency: patent  Anesthetic complications: No anesthetic complications    Cardiovascular status: acceptable  Respiratory status: acceptable  Hydration status: acceptable    Comments: /84 (BP Location: Left arm, Patient Position: Lying)   Pulse (!) 49   Temp 36.7 °C (98 °F) (Infrared)   Resp 16   Ht 193 cm (76\")   Wt 78.9 kg (174 lb)   SpO2 100%   BMI 21.18 kg/m²       "

## 2022-12-22 NOTE — H&P
No chief complaint on file.      HPI  Patient today for an EGD due to nausea and bloating and a colonoscopy for abdominal pain and diarrhea.         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   • H/O fracture of foot   • Lower abdominal pain   • Nausea   • Bloating   • Loose stools   • History of peptic ulcer disease       Medical History:    Past Medical History:   Diagnosis Date   • GERD (gastroesophageal reflux disease)    • Stomach ulcer         Social History:    Social History     Socioeconomic History   • Marital status:    Tobacco Use   • Smoking status: Never   • Smokeless tobacco: Never   Vaping Use   • Vaping Use: Never used   Substance and Sexual Activity   • Alcohol use: Yes     Comment: socially   • Drug use: No   • Sexual activity: Yes     Partners: Female       Family History:   Family History   Problem Relation Age of Onset   • Colon cancer Neg Hx    • Colon polyps Neg Hx    • Crohn's disease Neg Hx    • Irritable bowel syndrome Neg Hx    • Ulcerative colitis Neg Hx        Surgical History:   Past Surgical History:   Procedure Laterality Date   • ENDOSCOPY           Current Facility-Administered Medications:   •  lactated ringers infusion 1,000 mL, 1,000 mL, Intravenous, Continuous, Glenn Bro MD  •  lactated ringers infusion, 30 mL/hr, Intravenous, Continuous PRN, Beto, Regina G, APRN  •  lidocaine (XYLOCAINE) 1 % injection 0.5 mL, 0.5 mL, Intradermal, Once PRN, Glenn Bro MD  •  sodium chloride 0.9 % flush 10 mL, 10 mL, Intravenous, PRN, Beto, Regina G, APRN  •  sodium chloride 0.9 % flush 10 mL, 10 mL, Intravenous, PRN, Glenn Bro MD  •  sodium chloride 0.9 % flush 3 mL, 3 mL, Intravenous, Q12H, Beto, Regina G, APRN    Allergies:   Allergies   Allergen Reactions   • Sulfa Antibiotics    •  Wheat Other (See Comments)        The following portions of the patient's history were reviewed by me and updated as appropriate: review of systems, allergies, current medications, past family history, past medical history, past social history, past surgical history and problem list.    Vitals:    12/22/22 0722   BP: 135/97   Pulse: 102   Resp: 16   Temp: 97.5 °F (36.4 °C)   SpO2: 100%       PHYSICAL EXAM:    CONSTITUTIONAL:  today's vital signs reviewed by me  GASTROINTESTINAL: abdomen is soft nontender nondistended with normal active bowel sounds, no masses are appreciated    Assessment/ Plan  We will proceed today with EGD and colonoscopy.    Risks and benefits as well as alternatives to endoscopic evaluation were explained to the patient and they voiced understanding and wish to proceed.  These risks include but are not limited to the risk of bleeding, perforation, adverse reaction to sedation, and missed lesions.  The patient was given the opportunity to ask questions prior to the endoscopic procedure.

## 2022-12-23 LAB
LAB AP CASE REPORT: NORMAL
LAB AP CLINICAL INFORMATION: NORMAL
PATH REPORT.FINAL DX SPEC: NORMAL
PATH REPORT.GROSS SPEC: NORMAL

## 2023-01-12 ENCOUNTER — OFFICE VISIT (OUTPATIENT)
Dept: GASTROENTEROLOGY | Facility: CLINIC | Age: 38
End: 2023-01-12
Payer: COMMERCIAL

## 2023-01-12 VITALS
WEIGHT: 178.7 LBS | BODY MASS INDEX: 21.76 KG/M2 | OXYGEN SATURATION: 99 % | SYSTOLIC BLOOD PRESSURE: 114 MMHG | HEIGHT: 76 IN | DIASTOLIC BLOOD PRESSURE: 70 MMHG | TEMPERATURE: 97.6 F | HEART RATE: 73 BPM

## 2023-01-12 DIAGNOSIS — R19.5 LOOSE STOOLS: ICD-10-CM

## 2023-01-12 DIAGNOSIS — R10.30 LOWER ABDOMINAL PAIN: Primary | ICD-10-CM

## 2023-01-12 DIAGNOSIS — R12 HEARTBURN: ICD-10-CM

## 2023-01-12 DIAGNOSIS — Z87.11 HISTORY OF PEPTIC ULCER DISEASE: ICD-10-CM

## 2023-01-12 DIAGNOSIS — R14.0 BLOATING: ICD-10-CM

## 2023-01-12 PROCEDURE — 99214 OFFICE O/P EST MOD 30 MIN: CPT

## 2023-01-12 NOTE — PROGRESS NOTES
Chief Complaint   Patient presents with   • Abdominal Pain   • Follow-up     After EGD and colonoscopy           History of Present Illness    Patient is a 37 y.o. who presents to the office for follow up evaluation.  Last in office visit was on 10/20/2022 with STEWART Roberts for abdominal pain and loose stools-recommended EGD and colonoscopy to further evaluate symptoms.  Patient has a significant past medical history of longstanding history of GI symptoms including abdominal cramping, bloating, indigestion, nausea, and loose stools-positive association with stress previous response to BuSpar and dicyclomine..    In 2014 diagnosed with bleeding ulcer- suggested etiology to stress, NSAIDs, and regular alcohol consumption.  Since this time he has had persistent anemia-positive family history of beta thalassemia.  Denies genetic testing.    In 2018 diagnosed with wheat allergy via blood work and skin testing.  Serologic testing negative for celiac disease however found to be mildly reactive to peanut and soy.  Since this time he has been consuming wheat.    On 12/22/2022 patient underwent EGD and colonoscopy evaluation with Dr. Bro.  EGD was overall unremarkable except for benign gastric polyps with biopsies of gastric tissue and duodenum negative for H. pylori or celiac disease.    Colonoscopy noted an 8 mm hyperplastic polyp in the transverse colon.  Random biopsies of colon negative for microscopic colitis.    He presents today for follow-up discussion after undergoing EGD and colonoscopy evaluation with Dr. Bro.  Reports that upper GI symptoms are well controlled on current omeprazole 20 mg once daily approximately 30 to 60 minutes prior to breakfast without heartburn, nausea, vomiting, or dysphagia.     States that he continues to experience occasional lower abdominal cramping, abdominal bloating, and increased flatulence occurring approximately 1 to less than 1 time per week with relation to increased  "environmental stress.  Denies probiotic use at this time.    Describes bowel movements as regular occurring daily without evidence of melena or hematochezia with sensation of complete evacuation of stool.  Outside of symptom flares stool consistency remains soft and formed.  Reports that dicyclomine has not been as effective in reducing abdominal cramping compared to initiation of therapy.  Denies previous treatment with hyoscyamine.    Weight has improved from 169 on 10/20/2020 22 to 178 pounds at time of today's visit.     Family history of beta thalassemia anemia.  Denies known family history of colon cancer, colon polyps, or IBD.       Result Review :       Office Visit with Regina Pérez APRN (10/20/2022)  UPPER GI ENDOSCOPY (12/22/2022 07:51)  Tissue Pathology Exam (12/22/2022 08:04)  COLONOSCOPY (12/22/2022 07:51)    Vital Signs:   /70   Pulse 73   Temp 97.6 °F (36.4 °C)   Ht 193 cm (76\")   Wt 81.1 kg (178 lb 11.2 oz)   SpO2 99%   BMI 21.75 kg/m²     Body mass index is 21.75 kg/m².     Physical Exam  Vitals reviewed.   Constitutional:       General: He is not in acute distress.     Appearance: He is well-developed.   HENT:      Head: Normocephalic and atraumatic.   Pulmonary:      Effort: Pulmonary effort is normal. No respiratory distress.   Abdominal:      General: Abdomen is flat. Bowel sounds are normal. There is no distension.      Palpations: Abdomen is soft. There is no mass.      Tenderness: There is no abdominal tenderness. There is no guarding or rebound.      Hernia: No hernia is present.   Skin:     General: Skin is dry.      Coloration: Skin is not pale.   Neurological:      Mental Status: He is alert and oriented to person, place, and time.   Psychiatric:         Thought Content: Thought content normal.           Assessment and Plan    Diagnoses and all orders for this visit:    1. Lower abdominal pain (Primary)  -     hyoscyamine (LEVSIN) 0.125 MG SL tablet; Take 1 tablet by " mouth Every 4 (Four) Hours As Needed for Cramping.  Dispense: 90 tablet; Refill: 2    2. Bloating  -     hyoscyamine (LEVSIN) 0.125 MG SL tablet; Take 1 tablet by mouth Every 4 (Four) Hours As Needed for Cramping.  Dispense: 90 tablet; Refill: 2    3. Loose stools    4. History of peptic ulcer disease    5. Heartburn           Discussion:    Patient is a pleasant 37 y.o.  who presents to the office for follow up evaluation.  Reviewed results of EGD and colonoscopy evaluation at length with patient.    For GERD, will continue patient on current omeprazole 20 mg once daily.    For persistent occasional abdominal cramping and flatulence, will stop dicyclomine and transition patient to sublingual hyoscyamine 125 mcg to be taken every 4 hours as needed.  Also encourage patient to begin daily probiotic supplementation and as needed over-the-counter simethicone with written recommendations provided to patient.    Patient to follow-up in 3 to 6 months to discuss response or sooner for any new or worsening GI concerns.      Patient is agreeable to the outlined above treatment plan.  Verbalizes understanding and will contact office for any new or worsening concerns.  All questions answered and support provided.        Patient Instructions   For Abdominal Bloating and Gas:    1. Recommend starting a daily probiotic.  Recommend Align or [employer] available over-the-counter.  Take 1 capsule daily with food.    Hyoscyamine 125 mcg every 4 hours as needed            EMR Dragon/Transcription Disclaimer:  This document has been Dictated utilizing Dragon dictation.

## 2023-01-12 NOTE — PATIENT INSTRUCTIONS
For Abdominal Bloating and Gas:    Recommend starting a daily probiotic.  Recommend Align or [employer] available over-the-counter.  Take 1 capsule daily with food.    Hyoscyamine 125 mcg every 4 hours as needed

## (undated) DEVICE — Device

## (undated) DEVICE — VIAL FORMLN CAP 10PCT 20ML

## (undated) DEVICE — BITEBLOCK OMNI BLOC

## (undated) DEVICE — LASSO POLYPECTOMY SNARE: Brand: LASSO

## (undated) DEVICE — JACKT LAB F/R KNIT CUFF/COLR XLG BLU

## (undated) DEVICE — MSK ENDO PORT O2 POM ELITE CURAPLEX A/

## (undated) DEVICE — FLEX ADVANTAGE 1500CC: Brand: FLEX ADVANTAGE

## (undated) DEVICE — GOWN ,SIRUS,NONREINFORCED 3XL: Brand: MEDLINE

## (undated) DEVICE — KT ORCA ORCAPOD DISP STRL

## (undated) DEVICE — SINGLE-USE BIOPSY FORCEPS: Brand: RADIAL JAW 4